# Patient Record
Sex: FEMALE | Race: OTHER | HISPANIC OR LATINO | Employment: UNEMPLOYED | URBAN - METROPOLITAN AREA
[De-identification: names, ages, dates, MRNs, and addresses within clinical notes are randomized per-mention and may not be internally consistent; named-entity substitution may affect disease eponyms.]

---

## 2019-11-08 ENCOUNTER — HOSPITAL ENCOUNTER (EMERGENCY)
Facility: HOSPITAL | Age: 44
Discharge: PSYCHIATRIC OR SUBSTANCE USE FACILITY | End: 2019-11-09
Attending: EMERGENCY MEDICINE
Payer: COMMERCIAL

## 2019-11-08 DIAGNOSIS — F30.10 MANIC BEHAVIOR: Primary | ICD-10-CM

## 2019-11-08 PROBLEM — S02.92XA FACIAL BONES, CLOSED FRACTURE: Status: ACTIVE | Noted: 2019-11-08

## 2019-11-08 PROBLEM — F31.9 BIPOLAR I DISORDER: Status: ACTIVE | Noted: 2019-10-22

## 2019-11-08 PROCEDURE — 80178 ASSAY OF LITHIUM: CPT

## 2019-11-08 PROCEDURE — 80329 ANALGESICS NON-OPIOID 1 OR 2: CPT

## 2019-11-08 PROCEDURE — 80307 DRUG TEST PRSMV CHEM ANLYZR: CPT

## 2019-11-08 PROCEDURE — 96360 HYDRATION IV INFUSION INIT: CPT

## 2019-11-08 PROCEDURE — 81001 URINALYSIS AUTO W/SCOPE: CPT

## 2019-11-08 PROCEDURE — 81025 URINE PREGNANCY TEST: CPT | Performed by: PHYSICIAN ASSISTANT

## 2019-11-08 PROCEDURE — 80320 DRUG SCREEN QUANTALCOHOLS: CPT

## 2019-11-08 PROCEDURE — 99284 PR EMERGENCY DEPT VISIT,LEVEL IV: ICD-10-PCS | Mod: ,,, | Performed by: EMERGENCY MEDICINE

## 2019-11-08 PROCEDURE — 99284 EMERGENCY DEPT VISIT MOD MDM: CPT | Mod: ,,, | Performed by: EMERGENCY MEDICINE

## 2019-11-08 PROCEDURE — 99285 EMERGENCY DEPT VISIT HI MDM: CPT | Mod: 25

## 2019-11-08 PROCEDURE — 82962 GLUCOSE BLOOD TEST: CPT

## 2019-11-08 PROCEDURE — 80053 COMPREHEN METABOLIC PANEL: CPT

## 2019-11-08 PROCEDURE — 84443 ASSAY THYROID STIM HORMONE: CPT

## 2019-11-08 PROCEDURE — 85025 COMPLETE CBC W/AUTO DIFF WBC: CPT

## 2019-11-08 RX ORDER — TRAZODONE HYDROCHLORIDE 50 MG/1
50 TABLET ORAL
Status: ON HOLD | COMMUNITY
End: 2019-11-14 | Stop reason: HOSPADM

## 2019-11-08 RX ORDER — LORATADINE 10 MG/1
10 TABLET ORAL
Status: ON HOLD | COMMUNITY
End: 2019-11-14 | Stop reason: HOSPADM

## 2019-11-08 RX ORDER — AMOXICILLIN 500 MG/1
500 TABLET, FILM COATED ORAL
Status: ON HOLD | COMMUNITY
End: 2019-11-14 | Stop reason: HOSPADM

## 2019-11-08 RX ORDER — LAMOTRIGINE 150 MG/1
150 TABLET ORAL
Status: ON HOLD | COMMUNITY
End: 2019-11-14 | Stop reason: HOSPADM

## 2019-11-09 VITALS
WEIGHT: 140 LBS | RESPIRATION RATE: 20 BRPM | BODY MASS INDEX: 23.9 KG/M2 | TEMPERATURE: 98 F | DIASTOLIC BLOOD PRESSURE: 51 MMHG | OXYGEN SATURATION: 99 % | SYSTOLIC BLOOD PRESSURE: 96 MMHG | HEART RATE: 84 BPM | HEIGHT: 64 IN

## 2019-11-09 PROBLEM — F31.9 BIPOLAR 1 DISORDER: Status: ACTIVE | Noted: 2019-11-09

## 2019-11-09 PROBLEM — E11.9 TYPE 2 DIABETES MELLITUS: Status: ACTIVE | Noted: 2019-11-09

## 2019-11-09 LAB
ALBUMIN SERPL BCP-MCNC: 3.8 G/DL (ref 3.5–5.2)
ALP SERPL-CCNC: 59 U/L (ref 55–135)
ALT SERPL W/O P-5'-P-CCNC: 17 U/L (ref 10–44)
AMPHET+METHAMPHET UR QL: NEGATIVE
ANION GAP SERPL CALC-SCNC: 13 MMOL/L (ref 8–16)
APAP SERPL-MCNC: <3 UG/ML (ref 10–20)
AST SERPL-CCNC: 18 U/L (ref 10–40)
B-HCG UR QL: NEGATIVE
BACTERIA #/AREA URNS AUTO: ABNORMAL /HPF
BARBITURATES UR QL SCN>200 NG/ML: NEGATIVE
BASOPHILS # BLD AUTO: 0.04 K/UL (ref 0–0.2)
BASOPHILS NFR BLD: 0.5 % (ref 0–1.9)
BENZODIAZ UR QL SCN>200 NG/ML: NEGATIVE
BILIRUB SERPL-MCNC: 0.2 MG/DL (ref 0.1–1)
BILIRUB UR QL STRIP: NEGATIVE
BUN SERPL-MCNC: 22 MG/DL (ref 6–20)
BZE UR QL SCN: NEGATIVE
CALCIUM SERPL-MCNC: 8.9 MG/DL (ref 8.7–10.5)
CANNABINOIDS UR QL SCN: NEGATIVE
CHLORIDE SERPL-SCNC: 105 MMOL/L (ref 95–110)
CLARITY UR REFRACT.AUTO: ABNORMAL
CO2 SERPL-SCNC: 23 MMOL/L (ref 23–29)
COLOR UR AUTO: YELLOW
CREAT SERPL-MCNC: 0.7 MG/DL (ref 0.5–1.4)
CREAT UR-MCNC: 133 MG/DL (ref 15–325)
CTP QC/QA: YES
DIFFERENTIAL METHOD: ABNORMAL
EOSINOPHIL # BLD AUTO: 0.3 K/UL (ref 0–0.5)
EOSINOPHIL NFR BLD: 3.1 % (ref 0–8)
ERYTHROCYTE [DISTWIDTH] IN BLOOD BY AUTOMATED COUNT: 12.2 % (ref 11.5–14.5)
EST. GFR  (AFRICAN AMERICAN): >60 ML/MIN/1.73 M^2
EST. GFR  (NON AFRICAN AMERICAN): >60 ML/MIN/1.73 M^2
ETHANOL SERPL-MCNC: <10 MG/DL
GLUCOSE SERPL-MCNC: 224 MG/DL (ref 70–110)
GLUCOSE UR QL STRIP: ABNORMAL
HCT VFR BLD AUTO: 38.5 % (ref 37–48.5)
HGB BLD-MCNC: 12.8 G/DL (ref 12–16)
HGB UR QL STRIP: NEGATIVE
IMM GRANULOCYTES # BLD AUTO: 0.04 K/UL (ref 0–0.04)
IMM GRANULOCYTES NFR BLD AUTO: 0.5 % (ref 0–0.5)
KETONES UR QL STRIP: ABNORMAL
LEUKOCYTE ESTERASE UR QL STRIP: NEGATIVE
LITHIUM SERPL-SCNC: <0.1 MMOL/L (ref 0.6–1.2)
LYMPHOCYTES # BLD AUTO: 3.1 K/UL (ref 1–4.8)
LYMPHOCYTES NFR BLD: 36.3 % (ref 18–48)
MCH RBC QN AUTO: 33.2 PG (ref 27–31)
MCHC RBC AUTO-ENTMCNC: 33.2 G/DL (ref 32–36)
MCV RBC AUTO: 100 FL (ref 82–98)
METHADONE UR QL SCN>300 NG/ML: NEGATIVE
MICROSCOPIC COMMENT: ABNORMAL
MONOCYTES # BLD AUTO: 0.6 K/UL (ref 0.3–1)
MONOCYTES NFR BLD: 7.4 % (ref 4–15)
NEUTROPHILS # BLD AUTO: 4.5 K/UL (ref 1.8–7.7)
NEUTROPHILS NFR BLD: 52.2 % (ref 38–73)
NITRITE UR QL STRIP: NEGATIVE
NRBC BLD-RTO: 0 /100 WBC
OPIATES UR QL SCN: NEGATIVE
PCP UR QL SCN>25 NG/ML: NEGATIVE
PH UR STRIP: 5 [PH] (ref 5–8)
PLATELET # BLD AUTO: 220 K/UL (ref 150–350)
PMV BLD AUTO: 9.4 FL (ref 9.2–12.9)
POCT GLUCOSE: 198 MG/DL (ref 70–110)
POTASSIUM SERPL-SCNC: 3.7 MMOL/L (ref 3.5–5.1)
PROT SERPL-MCNC: 6.5 G/DL (ref 6–8.4)
PROT UR QL STRIP: NEGATIVE
RBC # BLD AUTO: 3.85 M/UL (ref 4–5.4)
RBC #/AREA URNS AUTO: 9 /HPF (ref 0–4)
SODIUM SERPL-SCNC: 141 MMOL/L (ref 136–145)
SP GR UR STRIP: 1.02 (ref 1–1.03)
SQUAMOUS #/AREA URNS AUTO: 3 /HPF
TOXICOLOGY INFORMATION: NORMAL
TSH SERPL DL<=0.005 MIU/L-ACNC: 0.99 UIU/ML (ref 0.4–4)
URN SPEC COLLECT METH UR: ABNORMAL
WBC # BLD AUTO: 8.66 K/UL (ref 3.9–12.7)
WBC #/AREA URNS AUTO: 0 /HPF (ref 0–5)
YEAST UR QL AUTO: ABNORMAL

## 2019-11-09 PROCEDURE — 63600175 PHARM REV CODE 636 W HCPCS: Performed by: PHYSICIAN ASSISTANT

## 2019-11-09 RX ADMIN — SODIUM CHLORIDE 500 ML: 0.9 INJECTION, SOLUTION INTRAVENOUS at 01:11

## 2019-11-09 NOTE — ED NOTES
"Pt belongings placed in belongings bag as follows    1 pair red boots  1 pair jeans  1 ana luisa dye belt  1 pair earrings  1 black shirt  1 white bra  1 [employer] watch  1 rubber bracelet  1 jacket  1 red necklace  1 feather ring, 1 blue/white ring, 1 thumb ring saying "Lord", 1 [employer] ring  1 cellphone  "

## 2019-11-09 NOTE — ED NOTES
Pt calm and cooperative at present.  Sitter at bedside documenting Q15 min checks with pt in direct view.  Pt in NAD breathing even and unlabored.  Pt denies discomfort at present.  Will continue to monitor.   No

## 2019-11-09 NOTE — ED NOTES
LOC: Pt is awake, alert, oriented x4. Affect is hyperalert. Speech clear and appropriate.      Appearance: Pt resting comfortably in no acute distress. Pt clean and well groomed.     Skin: Skin warm and dry. Color consistent with ethnicity. Skin turgor normal. Mucus membranes moist. Skin intact. No breakdown or bruising noted.     Musculoskeletal: Pt moving upper and lower extremities without difficulty. Denies weakness. Has brace to right wrist for previous injury.     Respiratory: Airway open and patent. Respirations spontaneous, even, easy, and unlabored. Pt has normal effort and rate. No accessory muscle use noted. Denies cough. Denies shortness of breath.     Cardiovascular: No peripheral edema noted. No complaints of chest pain. S1S2 present. Rate regular. Radial pulses 2+.     Abdominal: Abdomen soft and nontender. No distention noted. Denies n/v. Bowels sounds active x 4 quadrants.     Neurological: Eyes open spontaneously. Behavior appropriate to situation. Follows commands appropriately. Facial expression symmetrical. Purposeful motor response. Sensation intact.

## 2019-11-09 NOTE — ED NOTES
Pt calm and cooperative at present.  Sitter at bedside documenting Q15 min checks with pt in direct view.  Pt in NAD breathing even and unlabored.  Pt denies discomfort at present.  Will continue to monitor.

## 2019-11-09 NOTE — ED TRIAGE NOTES
Brought in per  office under OPC. OPC placed per sister. Pt states her sister and her were arguing about selling her late mothers house so sister had OPC placed on her. Pt denies threatening to hurt anybody or herself. Pt has hx of suicidal ideations with one attempt approx 2 years ago. Pt took handful of metformin on suicide attempt. Pt seeing outside physcologist. Has received in patient treatment at [facility] before. Pt cooperative at this time.
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
## 2021-03-15 ENCOUNTER — PATIENT MESSAGE (OUTPATIENT)
Dept: ADMINISTRATIVE | Facility: CLINIC | Age: 46
End: 2021-03-15

## 2021-03-20 ENCOUNTER — IMMUNIZATION (OUTPATIENT)
Dept: PRIMARY CARE CLINIC | Facility: CLINIC | Age: 46
End: 2021-03-20
Payer: COMMERCIAL

## 2021-03-20 DIAGNOSIS — Z23 NEED FOR VACCINATION: Primary | ICD-10-CM

## 2021-03-20 PROCEDURE — 0001A PR IMMUNIZ ADMIN, SARS-COV-2 COVID-19 VACC, 30MCG/0.3ML, 1ST DOSE: ICD-10-PCS | Mod: CV19,S$GLB,, | Performed by: INTERNAL MEDICINE

## 2021-03-20 PROCEDURE — 0001A PR IMMUNIZ ADMIN, SARS-COV-2 COVID-19 VACC, 30MCG/0.3ML, 1ST DOSE: CPT | Mod: CV19,S$GLB,, | Performed by: INTERNAL MEDICINE

## 2021-03-20 PROCEDURE — 91300 PR SARS-COV- 2 COVID-19 VACCINE, NO PRSV, 30MCG/0.3ML, IM: CPT | Mod: S$GLB,,, | Performed by: INTERNAL MEDICINE

## 2021-03-20 PROCEDURE — 91300 PR SARS-COV- 2 COVID-19 VACCINE, NO PRSV, 30MCG/0.3ML, IM: ICD-10-PCS | Mod: S$GLB,,, | Performed by: INTERNAL MEDICINE

## 2021-03-20 RX ADMIN — Medication 0.3 ML: at 10:03

## 2021-04-18 ENCOUNTER — IMMUNIZATION (OUTPATIENT)
Dept: PRIMARY CARE CLINIC | Facility: CLINIC | Age: 46
End: 2021-04-18

## 2021-04-18 DIAGNOSIS — Z23 NEED FOR VACCINATION: Primary | ICD-10-CM

## 2021-04-18 PROCEDURE — 0002A PR IMMUNIZ ADMIN, SARS-COV-2 COVID-19 VACC, 30MCG/0.3ML, 2ND DOSE: ICD-10-PCS | Mod: CV19,S$GLB,, | Performed by: INTERNAL MEDICINE

## 2021-04-18 PROCEDURE — 91300 PR SARS-COV- 2 COVID-19 VACCINE, NO PRSV, 30MCG/0.3ML, IM: CPT | Mod: S$GLB,,, | Performed by: INTERNAL MEDICINE

## 2021-04-18 PROCEDURE — 0002A PR IMMUNIZ ADMIN, SARS-COV-2 COVID-19 VACC, 30MCG/0.3ML, 2ND DOSE: CPT | Mod: CV19,S$GLB,, | Performed by: INTERNAL MEDICINE

## 2021-04-18 PROCEDURE — 91300 PR SARS-COV- 2 COVID-19 VACCINE, NO PRSV, 30MCG/0.3ML, IM: ICD-10-PCS | Mod: S$GLB,,, | Performed by: INTERNAL MEDICINE

## 2021-04-18 RX ADMIN — Medication 0.3 ML: at 01:04

## 2024-07-08 ENCOUNTER — HOSPITAL ENCOUNTER (EMERGENCY)
Facility: HOSPITAL | Age: 49
Discharge: PSYCHIATRIC OR SUBSTANCE USE FACILITY | End: 2024-07-09
Attending: EMERGENCY MEDICINE
Payer: MEDICAID

## 2024-07-08 DIAGNOSIS — F30.10 MANIC BEHAVIOR: Primary | ICD-10-CM

## 2024-07-08 LAB
ALBUMIN SERPL BCP-MCNC: 4 G/DL (ref 3.5–5.2)
ALP SERPL-CCNC: 84 U/L (ref 55–135)
ALT SERPL W/O P-5'-P-CCNC: 25 U/L (ref 10–44)
AMPHET+METHAMPHET UR QL: NEGATIVE
ANION GAP SERPL CALC-SCNC: 9 MMOL/L (ref 8–16)
APAP SERPL-MCNC: <3 UG/ML (ref 10–20)
AST SERPL-CCNC: 17 U/L (ref 10–40)
BACTERIA #/AREA URNS AUTO: ABNORMAL /HPF
BARBITURATES UR QL SCN>200 NG/ML: NEGATIVE
BASOPHILS # BLD AUTO: 0.03 K/UL (ref 0–0.2)
BASOPHILS NFR BLD: 0.4 % (ref 0–1.9)
BENZODIAZ UR QL SCN>200 NG/ML: NEGATIVE
BILIRUB SERPL-MCNC: 0.4 MG/DL (ref 0.1–1)
BILIRUB UR QL STRIP: NEGATIVE
BUN SERPL-MCNC: 21 MG/DL (ref 6–20)
BZE UR QL SCN: NEGATIVE
CALCIUM SERPL-MCNC: 9.6 MG/DL (ref 8.7–10.5)
CANNABINOIDS UR QL SCN: NEGATIVE
CHLORIDE SERPL-SCNC: 103 MMOL/L (ref 95–110)
CLARITY UR REFRACT.AUTO: CLEAR
CO2 SERPL-SCNC: 23 MMOL/L (ref 23–29)
COLOR UR AUTO: COLORLESS
CREAT SERPL-MCNC: 0.7 MG/DL (ref 0.5–1.4)
CREAT UR-MCNC: 32 MG/DL (ref 15–325)
DIFFERENTIAL METHOD BLD: ABNORMAL
EOSINOPHIL # BLD AUTO: 0.1 K/UL (ref 0–0.5)
EOSINOPHIL NFR BLD: 1.7 % (ref 0–8)
ERYTHROCYTE [DISTWIDTH] IN BLOOD BY AUTOMATED COUNT: 12.3 % (ref 11.5–14.5)
EST. GFR  (NO RACE VARIABLE): >60 ML/MIN/1.73 M^2
ETHANOL SERPL-MCNC: <10 MG/DL
GLUCOSE SERPL-MCNC: 281 MG/DL (ref 70–110)
GLUCOSE UR QL STRIP: ABNORMAL
HCT VFR BLD AUTO: 36 % (ref 37–48.5)
HCV AB SERPL QL IA: NORMAL
HGB BLD-MCNC: 12.6 G/DL (ref 12–16)
HGB UR QL STRIP: ABNORMAL
HIV 1+2 AB+HIV1 P24 AG SERPL QL IA: NORMAL
IMM GRANULOCYTES # BLD AUTO: 0.03 K/UL (ref 0–0.04)
IMM GRANULOCYTES NFR BLD AUTO: 0.4 % (ref 0–0.5)
KETONES UR QL STRIP: NEGATIVE
LEUKOCYTE ESTERASE UR QL STRIP: NEGATIVE
LYMPHOCYTES # BLD AUTO: 2.3 K/UL (ref 1–4.8)
LYMPHOCYTES NFR BLD: 29.9 % (ref 18–48)
MCH RBC QN AUTO: 33.9 PG (ref 27–31)
MCHC RBC AUTO-ENTMCNC: 35 G/DL (ref 32–36)
MCV RBC AUTO: 97 FL (ref 82–98)
METHADONE UR QL SCN>300 NG/ML: NEGATIVE
MICROSCOPIC COMMENT: ABNORMAL
MONOCYTES # BLD AUTO: 0.5 K/UL (ref 0.3–1)
MONOCYTES NFR BLD: 6.9 % (ref 4–15)
NEUTROPHILS # BLD AUTO: 4.8 K/UL (ref 1.8–7.7)
NEUTROPHILS NFR BLD: 60.7 % (ref 38–73)
NITRITE UR QL STRIP: NEGATIVE
NRBC BLD-RTO: 0 /100 WBC
OPIATES UR QL SCN: NEGATIVE
PCP UR QL SCN>25 NG/ML: NEGATIVE
PH UR STRIP: 6 [PH] (ref 5–8)
PLATELET # BLD AUTO: 213 K/UL (ref 150–450)
PMV BLD AUTO: 9.8 FL (ref 9.2–12.9)
POTASSIUM SERPL-SCNC: 3.7 MMOL/L (ref 3.5–5.1)
PROT SERPL-MCNC: 7.2 G/DL (ref 6–8.4)
PROT UR QL STRIP: NEGATIVE
RBC # BLD AUTO: 3.72 M/UL (ref 4–5.4)
RBC #/AREA URNS AUTO: 4 /HPF (ref 0–4)
SODIUM SERPL-SCNC: 135 MMOL/L (ref 136–145)
SP GR UR STRIP: 1.02 (ref 1–1.03)
SQUAMOUS #/AREA URNS AUTO: 0 /HPF
TOXICOLOGY INFORMATION: NORMAL
TSH SERPL DL<=0.005 MIU/L-ACNC: 1.32 UIU/ML (ref 0.4–4)
URN SPEC COLLECT METH UR: ABNORMAL
WBC # BLD AUTO: 7.83 K/UL (ref 3.9–12.7)
WBC #/AREA URNS AUTO: 1 /HPF (ref 0–5)
YEAST UR QL AUTO: ABNORMAL

## 2024-07-08 PROCEDURE — 80053 COMPREHEN METABOLIC PANEL: CPT | Performed by: EMERGENCY MEDICINE

## 2024-07-08 PROCEDURE — 85025 COMPLETE CBC W/AUTO DIFF WBC: CPT | Performed by: EMERGENCY MEDICINE

## 2024-07-08 PROCEDURE — 81001 URINALYSIS AUTO W/SCOPE: CPT | Mod: XB | Performed by: EMERGENCY MEDICINE

## 2024-07-08 PROCEDURE — 84443 ASSAY THYROID STIM HORMONE: CPT | Performed by: EMERGENCY MEDICINE

## 2024-07-08 PROCEDURE — 86803 HEPATITIS C AB TEST: CPT | Performed by: PHYSICIAN ASSISTANT

## 2024-07-08 PROCEDURE — 80143 DRUG ASSAY ACETAMINOPHEN: CPT | Performed by: EMERGENCY MEDICINE

## 2024-07-08 PROCEDURE — 82077 ASSAY SPEC XCP UR&BREATH IA: CPT | Performed by: EMERGENCY MEDICINE

## 2024-07-08 PROCEDURE — 99285 EMERGENCY DEPT VISIT HI MDM: CPT

## 2024-07-08 PROCEDURE — 87389 HIV-1 AG W/HIV-1&-2 AB AG IA: CPT | Performed by: PHYSICIAN ASSISTANT

## 2024-07-08 PROCEDURE — 80307 DRUG TEST PRSMV CHEM ANLYZR: CPT | Performed by: EMERGENCY MEDICINE

## 2024-07-08 RX ORDER — ACETAMINOPHEN 500 MG
1000 TABLET ORAL
Status: COMPLETED | OUTPATIENT
Start: 2024-07-08 | End: 2024-07-09
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
## 2024-07-08 NOTE — ED NOTES
Valuables bag #[id number] with phone, wallet, vape, necklaces, rings, hair clips    Belongings bag in closet with clothes, shoes, calendar

## 2024-07-09 VITALS
DIASTOLIC BLOOD PRESSURE: 66 MMHG | OXYGEN SATURATION: 98 % | WEIGHT: 121 LBS | SYSTOLIC BLOOD PRESSURE: 126 MMHG | RESPIRATION RATE: 18 BRPM | HEIGHT: 64 IN | BODY MASS INDEX: 20.66 KG/M2 | TEMPERATURE: 98 F | HEART RATE: 69 BPM

## 2024-07-09 PROCEDURE — 25000003 PHARM REV CODE 250: Performed by: EMERGENCY MEDICINE

## 2024-07-09 RX ADMIN — ACETAMINOPHEN 1000 MG: 500 TABLET ORAL at 12:07

## 2024-07-09 NOTE — ED NOTES
Patient identifiers for Sandrine Roland checked and correct.    LOC: The patient is awake, alert and aware of environment with an appropriate affect, the patient is oriented x 4 and speaking appropriately.  APPEARANCE: Patient resting comfortably and in no acute distress, patient is clean and well groomed, patient's clothing is properly fastened.  SKIN: The skin is warm and dry, color consistent with ethnicity, patient has normal skin turgor and moist mucus membranes, skin intact, no breakdown or bruising noted.  MUSCULOSKELETAL: Patient moving all extremities well, no obvious swelling or deformities noted.  RESPIRATORY: Airway is open and patent, respirations are spontaneous and even, patient has a normal effort and rate.  CARDIAC: Patient has a normal rate and rhythm, no periphreal edema noted, capillary refill < 3 seconds.  ABDOMEN: Soft and non tender to palpation, no distention noted. Patient denies any nausea, vomiting, diarrhea, or constipation.   NEUROLOGIC: Eyes open spontaneously, PERRL, behavior appropriate to situation, follows commands, facial expression symmetrical, bilateral hand grasp equal and even, purposeful motor response noted, normal sensation in all extremities.   HEENT: No abnormalities noted. White sclera and pupils equal round and reactive to light. Denies headache, dizziness.   : Pt voids independently, denies dysuria, hematuria, frequency.

## 2024-07-09 NOTE — ED NOTES
"The patient is recv'd lying in bed awake. She is attired in [facility] scrubs w/ fair grooming & hygiene. Her affect is animated, mood is jovial & engaging. Thought process is tangential. She states the reason for her ED presentation is " my sister, the ring leader got a 's on me & lied & said I wasn't taking my medicine & I was." She denies S/HI, A/VH. She is maintained on DVC for safety, sitter present.  "

## 2024-07-09 NOTE — ED NOTES
The patient is escorted via w/c per EDT & hospital security to [facility] vehicle. All belongings given to [facility] . The patient did not state that anyone should be notified of her transfer.

## 2024-07-09 NOTE — ED NOTES
Patient has resumed a resting position in bed, pillow provided for comfort. DVC maintained for safety, sitter present. Awaiting transfer.

## 2024-07-09 NOTE — ED NOTES
Pt remains in paper scrubs, resting in stretcher comfortably - with side rails up, locked, and in lowest position. Chest rise and fall noted; breathing equal, even, and unlabored. Sitter remains at bedside in direct visual contact, charting per protocol every 15 minutes. No equipment or belongings are in the patients room to prevent self harm or injury. Pt aware of plan of care. No acute distress noted and no needs expressed at this time. Pt provided with sandwich and juice per request. Pt sitting up eating at this time.

## 2024-07-16 PROBLEM — F31.13 BIPOLAR DISORDER, CURRENT EPISODE MANIC, SEVERE: Status: ACTIVE | Noted: 2024-07-16

## 2024-09-04 DIAGNOSIS — S50.01XA CONTUSION OF RIGHT ELBOW: Primary | ICD-10-CM

## 2024-10-17 ENCOUNTER — PATIENT MESSAGE (OUTPATIENT)
Dept: RESEARCH | Facility: HOSPITAL | Age: 49
End: 2024-10-17
Payer: MEDICAID

## 2025-06-09 ENCOUNTER — HOSPITAL ENCOUNTER (EMERGENCY)
Facility: HOSPITAL | Age: 50
Discharge: PSYCHIATRIC OR SUBSTANCE USE FACILITY | End: 2025-06-10
Attending: EMERGENCY MEDICINE
Payer: MEDICAID

## 2025-06-09 VITALS
DIASTOLIC BLOOD PRESSURE: 84 MMHG | RESPIRATION RATE: 18 BRPM | BODY MASS INDEX: 23.68 KG/M2 | HEIGHT: 63 IN | TEMPERATURE: 99 F | SYSTOLIC BLOOD PRESSURE: 149 MMHG | HEART RATE: 93 BPM | WEIGHT: 133.63 LBS | OXYGEN SATURATION: 98 %

## 2025-06-09 DIAGNOSIS — E87.6 HYPOKALEMIA: ICD-10-CM

## 2025-06-09 DIAGNOSIS — F41.9 ANXIETY: ICD-10-CM

## 2025-06-09 DIAGNOSIS — Z00.8 MEDICAL CLEARANCE FOR PSYCHIATRIC ADMISSION: Primary | ICD-10-CM

## 2025-06-09 LAB
ABSOLUTE EOSINOPHIL (OHS): 0.25 K/UL
ABSOLUTE MONOCYTE (OHS): 0.9 K/UL (ref 0.3–1)
ABSOLUTE NEUTROPHIL COUNT (OHS): 10.25 K/UL (ref 1.8–7.7)
ALBUMIN SERPL BCP-MCNC: 5 G/DL (ref 3.5–5.2)
ALP SERPL-CCNC: 80 UNIT/L (ref 38–126)
ALT SERPL W/O P-5'-P-CCNC: 23 UNIT/L (ref 10–44)
AMPHET UR QL SCN: NEGATIVE
ANION GAP (OHS): 17 MMOL/L (ref 8–16)
APAP SERPL-MCNC: <10 UG/ML (ref 10–20)
AST SERPL-CCNC: 20 UNIT/L (ref 15–46)
BACTERIA #/AREA URNS HPF: ABNORMAL /HPF
BARBITURATE SCN PRESENT UR: NEGATIVE
BASOPHILS # BLD AUTO: 0.06 K/UL
BASOPHILS NFR BLD AUTO: 0.4 %
BENZODIAZ UR QL SCN: NEGATIVE
BILIRUB SERPL-MCNC: 0.9 MG/DL (ref 0.1–1)
BILIRUB UR QL STRIP.AUTO: NEGATIVE
BUN SERPL-MCNC: 4 MG/DL (ref 7–17)
CALCIUM SERPL-MCNC: 9.5 MG/DL (ref 8.7–10.5)
CANNABINOIDS UR QL SCN: NEGATIVE
CHLORIDE SERPL-SCNC: 98 MMOL/L (ref 95–110)
CLARITY UR: CLEAR
CO2 SERPL-SCNC: 19 MMOL/L (ref 23–29)
COCAINE UR QL SCN: NEGATIVE
COLOR UR AUTO: YELLOW
CREAT SERPL-MCNC: 0.4 MG/DL (ref 0.5–1.4)
CREAT UR-MCNC: 32.1 MG/DL (ref 15–325)
ERYTHROCYTE [DISTWIDTH] IN BLOOD BY AUTOMATED COUNT: 11.8 % (ref 11.5–14.5)
ETHANOL SERPL-MCNC: <10 MG/DL
GFR SERPLBLD CREATININE-BSD FMLA CKD-EPI: >60 ML/MIN/1.73/M2
GLUCOSE SERPL-MCNC: 274 MG/DL (ref 70–110)
GLUCOSE UR QL STRIP: ABNORMAL
HCT VFR BLD AUTO: 40.5 % (ref 37–48.5)
HGB BLD-MCNC: 14.8 GM/DL (ref 12–16)
HGB UR QL STRIP: ABNORMAL
HOLD SPECIMEN: NORMAL
IMM GRANULOCYTES # BLD AUTO: 0.05 K/UL (ref 0–0.04)
IMM GRANULOCYTES NFR BLD AUTO: 0.3 % (ref 0–0.5)
KETONES UR QL STRIP: ABNORMAL
LEUKOCYTE ESTERASE UR QL STRIP: NEGATIVE
LYMPHOCYTES # BLD AUTO: 4.53 K/UL (ref 1–4.8)
MCH RBC QN AUTO: 33.4 PG (ref 27–31)
MCHC RBC AUTO-ENTMCNC: 36.5 G/DL (ref 32–36)
MCV RBC AUTO: 91 FL (ref 82–98)
METHADONE UR QL SCN: NEGATIVE
MICROSCOPIC COMMENT: ABNORMAL
NITRITE UR QL STRIP: NEGATIVE
NUCLEATED RBC (/100WBC) (OHS): 0 /100 WBC
OPIATES UR QL SCN: NEGATIVE
PCP UR QL: NEGATIVE
PH UR STRIP: 6 [PH]
PLATELET # BLD AUTO: 230 K/UL (ref 150–450)
PMV BLD AUTO: 9.7 FL (ref 9.2–12.9)
POTASSIUM SERPL-SCNC: 3.3 MMOL/L (ref 3.5–5.1)
PROT SERPL-MCNC: 8.2 GM/DL (ref 6–8.4)
PROT UR QL STRIP: NEGATIVE
RBC # BLD AUTO: 4.43 M/UL (ref 4–5.4)
RBC #/AREA URNS HPF: 0 /HPF (ref 0–4)
RELATIVE EOSINOPHIL (OHS): 1.6 %
RELATIVE LYMPHOCYTE (OHS): 28.2 % (ref 18–48)
RELATIVE MONOCYTE (OHS): 5.6 % (ref 4–15)
RELATIVE NEUTROPHIL (OHS): 63.9 % (ref 38–73)
SODIUM SERPL-SCNC: 134 MMOL/L (ref 136–145)
SP GR UR STRIP: <=1.005
UROBILINOGEN UR STRIP-ACNC: NEGATIVE EU/DL
WBC # BLD AUTO: 16.04 K/UL (ref 3.9–12.7)
WBC #/AREA URNS HPF: 0 /HPF (ref 0–5)
YEAST URNS QL MICRO: ABNORMAL /HPF

## 2025-06-09 PROCEDURE — 82040 ASSAY OF SERUM ALBUMIN: CPT | Mod: ER | Performed by: EMERGENCY MEDICINE

## 2025-06-09 PROCEDURE — 85025 COMPLETE CBC W/AUTO DIFF WBC: CPT | Mod: ER | Performed by: EMERGENCY MEDICINE

## 2025-06-09 PROCEDURE — 99285 EMERGENCY DEPT VISIT HI MDM: CPT | Mod: ER

## 2025-06-09 PROCEDURE — 80307 DRUG TEST PRSMV CHEM ANLYZR: CPT | Mod: ER | Performed by: EMERGENCY MEDICINE

## 2025-06-09 PROCEDURE — 80143 DRUG ASSAY ACETAMINOPHEN: CPT | Mod: ER | Performed by: EMERGENCY MEDICINE

## 2025-06-09 PROCEDURE — S4991 NICOTINE PATCH NONLEGEND: HCPCS | Mod: ER | Performed by: EMERGENCY MEDICINE

## 2025-06-09 PROCEDURE — 25000003 PHARM REV CODE 250: Mod: ER | Performed by: EMERGENCY MEDICINE

## 2025-06-09 PROCEDURE — 81001 URINALYSIS AUTO W/SCOPE: CPT | Mod: ER | Performed by: EMERGENCY MEDICINE

## 2025-06-09 PROCEDURE — 82077 ASSAY SPEC XCP UR&BREATH IA: CPT | Mod: ER | Performed by: EMERGENCY MEDICINE

## 2025-06-09 RX ORDER — POTASSIUM CHLORIDE 20 MEQ/1
40 TABLET, EXTENDED RELEASE ORAL
Status: COMPLETED | OUTPATIENT
Start: 2025-06-09 | End: 2025-06-09

## 2025-06-09 RX ORDER — MELOXICAM 15 MG/1
15 TABLET ORAL DAILY
Status: ON HOLD | COMMUNITY
End: 2025-06-18 | Stop reason: HOSPADM

## 2025-06-09 RX ORDER — METFORMIN HYDROCHLORIDE 500 MG/1
1000 TABLET ORAL
Status: COMPLETED | OUTPATIENT
Start: 2025-06-09 | End: 2025-06-09

## 2025-06-09 RX ORDER — QUETIAPINE FUMARATE 100 MG/1
TABLET, FILM COATED ORAL
Status: ON HOLD | COMMUNITY
End: 2025-06-18 | Stop reason: HOSPADM

## 2025-06-09 RX ORDER — NITROFURANTOIN 25; 75 MG/1; MG/1
100 CAPSULE ORAL
Status: COMPLETED | OUTPATIENT
Start: 2025-06-09 | End: 2025-06-09

## 2025-06-09 RX ORDER — FLUOXETINE 10 MG/1
10 CAPSULE ORAL DAILY
Status: ON HOLD | COMMUNITY
End: 2025-06-18 | Stop reason: HOSPADM

## 2025-06-09 RX ORDER — IBUPROFEN 200 MG
1 TABLET ORAL
Status: DISCONTINUED | OUTPATIENT
Start: 2025-06-09 | End: 2025-06-10 | Stop reason: HOSPADM

## 2025-06-09 RX ADMIN — METFORMIN HYDROCHLORIDE 1000 MG: 500 TABLET ORAL at 11:06

## 2025-06-09 RX ADMIN — POTASSIUM CHLORIDE 40 MEQ: 1500 TABLET, EXTENDED RELEASE ORAL at 10:06

## 2025-06-09 RX ADMIN — NITROFURANTOIN MONOHYDRATE/MACROCRYSTALS 100 MG: 25; 75 CAPSULE ORAL at 10:06

## 2025-06-09 RX ADMIN — NICOTINE 1 PATCH: 21 PATCH, EXTENDED RELEASE TRANSDERMAL at 09:06

## 2025-06-10 ENCOUNTER — HOSPITAL ENCOUNTER (INPATIENT)
Facility: HOSPITAL | Age: 50
LOS: 8 days | Discharge: HOME | DRG: 885 | End: 2025-06-18
Attending: PSYCHIATRY & NEUROLOGY | Admitting: PSYCHIATRY & NEUROLOGY
Payer: MEDICAID

## 2025-06-10 DIAGNOSIS — R09.81 NOSE CONGESTION: ICD-10-CM

## 2025-06-10 DIAGNOSIS — F41.9 ANXIETY: ICD-10-CM

## 2025-06-10 DIAGNOSIS — F31.13 BIPOLAR DISORDER, CURRENT EPISODE MANIC WITHOUT PSYCHOTIC FEATURES, SEVERE: Primary | ICD-10-CM

## 2025-06-10 PROBLEM — E87.6 HYPOKALEMIA: Status: ACTIVE | Noted: 2025-06-10

## 2025-06-10 PROBLEM — N39.0 UTI (URINARY TRACT INFECTION): Status: ACTIVE | Noted: 2025-06-10

## 2025-06-10 LAB
POCT GLUCOSE: 267 MG/DL (ref 70–110)
POCT GLUCOSE: 296 MG/DL (ref 70–110)
POCT GLUCOSE: 326 MG/DL (ref 70–110)

## 2025-06-10 PROCEDURE — 11400000 HC PSYCH PRIVATE ROOM

## 2025-06-10 PROCEDURE — 99223 1ST HOSP IP/OBS HIGH 75: CPT | Mod: AF,HB,, | Performed by: PSYCHIATRY & NEUROLOGY

## 2025-06-10 PROCEDURE — 90833 PSYTX W PT W E/M 30 MIN: CPT | Mod: AF,HB,, | Performed by: PSYCHIATRY & NEUROLOGY

## 2025-06-10 PROCEDURE — 25000003 PHARM REV CODE 250: Performed by: NURSE PRACTITIONER

## 2025-06-10 PROCEDURE — 63600175 PHARM REV CODE 636 W HCPCS: Performed by: NURSE PRACTITIONER

## 2025-06-10 PROCEDURE — S4991 NICOTINE PATCH NONLEGEND: HCPCS | Performed by: PSYCHIATRY & NEUROLOGY

## 2025-06-10 PROCEDURE — 25000003 PHARM REV CODE 250: Performed by: PSYCHIATRY & NEUROLOGY

## 2025-06-10 RX ORDER — IBUPROFEN 400 MG/1
400 TABLET, FILM COATED ORAL EVERY 6 HOURS PRN
Status: DISCONTINUED | OUTPATIENT
Start: 2025-06-10 | End: 2025-06-10

## 2025-06-10 RX ORDER — LOPERAMIDE HYDROCHLORIDE 2 MG/1
4 CAPSULE ORAL 4 TIMES DAILY PRN
Status: DISCONTINUED | OUTPATIENT
Start: 2025-06-10 | End: 2025-06-10

## 2025-06-10 RX ORDER — ALUMINUM HYDROXIDE, MAGNESIUM HYDROXIDE, AND SIMETHICONE 1200; 120; 1200 MG/30ML; MG/30ML; MG/30ML
30 SUSPENSION ORAL EVERY 6 HOURS PRN
Status: DISCONTINUED | OUTPATIENT
Start: 2025-06-10 | End: 2025-06-18 | Stop reason: HOSPADM

## 2025-06-10 RX ORDER — LURASIDONE HYDROCHLORIDE 20 MG/1
20 TABLET, FILM COATED ORAL
Status: DISCONTINUED | OUTPATIENT
Start: 2025-06-10 | End: 2025-06-11

## 2025-06-10 RX ORDER — BENZTROPINE MESYLATE 1 MG/ML
2 INJECTION, SOLUTION INTRAMUSCULAR; INTRAVENOUS EVERY 8 HOURS PRN
Status: DISCONTINUED | OUTPATIENT
Start: 2025-06-10 | End: 2025-06-18 | Stop reason: HOSPADM

## 2025-06-10 RX ORDER — DOCUSATE SODIUM 100 MG/1
100 CAPSULE, LIQUID FILLED ORAL DAILY PRN
Status: DISCONTINUED | OUTPATIENT
Start: 2025-06-10 | End: 2025-06-10 | Stop reason: SDUPTHER

## 2025-06-10 RX ORDER — BENZONATATE 100 MG/1
100 CAPSULE ORAL 3 TIMES DAILY PRN
Status: DISCONTINUED | OUTPATIENT
Start: 2025-06-10 | End: 2025-06-18 | Stop reason: HOSPADM

## 2025-06-10 RX ORDER — HYDROXYZINE PAMOATE 50 MG/1
50 CAPSULE ORAL EVERY 6 HOURS PRN
Status: DISCONTINUED | OUTPATIENT
Start: 2025-06-10 | End: 2025-06-16

## 2025-06-10 RX ORDER — IBUPROFEN 200 MG
16 TABLET ORAL
Status: DISCONTINUED | OUTPATIENT
Start: 2025-06-10 | End: 2025-06-18 | Stop reason: HOSPADM

## 2025-06-10 RX ORDER — PROPRANOLOL HYDROCHLORIDE 10 MG/1
10 TABLET ORAL 2 TIMES DAILY
Status: DISCONTINUED | OUTPATIENT
Start: 2025-06-10 | End: 2025-06-14

## 2025-06-10 RX ORDER — HYDROXYZINE PAMOATE 50 MG/1
50 CAPSULE ORAL EVERY 6 HOURS PRN
Status: DISCONTINUED | OUTPATIENT
Start: 2025-06-10 | End: 2025-06-10 | Stop reason: SDUPTHER

## 2025-06-10 RX ORDER — OLANZAPINE 10 MG/1
10 TABLET, FILM COATED ORAL EVERY 8 HOURS PRN
Status: DISCONTINUED | OUTPATIENT
Start: 2025-06-10 | End: 2025-06-18 | Stop reason: HOSPADM

## 2025-06-10 RX ORDER — INSULIN ASPART 100 [IU]/ML
0-5 INJECTION, SOLUTION INTRAVENOUS; SUBCUTANEOUS
Status: DISCONTINUED | OUTPATIENT
Start: 2025-06-10 | End: 2025-06-18 | Stop reason: HOSPADM

## 2025-06-10 RX ORDER — LOPERAMIDE HYDROCHLORIDE 2 MG/1
2 CAPSULE ORAL
Status: DISCONTINUED | OUTPATIENT
Start: 2025-06-10 | End: 2025-06-18 | Stop reason: HOSPADM

## 2025-06-10 RX ORDER — OLANZAPINE 10 MG/2ML
10 INJECTION, POWDER, FOR SOLUTION INTRAMUSCULAR EVERY 8 HOURS PRN
Status: DISCONTINUED | OUTPATIENT
Start: 2025-06-10 | End: 2025-06-18 | Stop reason: HOSPADM

## 2025-06-10 RX ORDER — NITROFURANTOIN 25; 75 MG/1; MG/1
100 CAPSULE ORAL EVERY 12 HOURS
Status: COMPLETED | OUTPATIENT
Start: 2025-06-10 | End: 2025-06-16

## 2025-06-10 RX ORDER — DOCUSATE SODIUM 100 MG/1
100 CAPSULE, LIQUID FILLED ORAL DAILY PRN
Status: DISCONTINUED | OUTPATIENT
Start: 2025-06-10 | End: 2025-06-10

## 2025-06-10 RX ORDER — IBUPROFEN 200 MG
1 TABLET ORAL DAILY PRN
Status: DISCONTINUED | OUTPATIENT
Start: 2025-06-10 | End: 2025-06-18 | Stop reason: HOSPADM

## 2025-06-10 RX ORDER — ONDANSETRON 4 MG/1
4 TABLET, ORALLY DISINTEGRATING ORAL EVERY 8 HOURS PRN
Status: DISCONTINUED | OUTPATIENT
Start: 2025-06-10 | End: 2025-06-18 | Stop reason: HOSPADM

## 2025-06-10 RX ORDER — BUSPIRONE HYDROCHLORIDE 5 MG/1
5 TABLET ORAL 2 TIMES DAILY
Status: DISCONTINUED | OUTPATIENT
Start: 2025-06-10 | End: 2025-06-13

## 2025-06-10 RX ORDER — ALUMINUM HYDROXIDE, MAGNESIUM HYDROXIDE, AND SIMETHICONE 1200; 120; 1200 MG/30ML; MG/30ML; MG/30ML
30 SUSPENSION ORAL EVERY 6 HOURS PRN
Status: DISCONTINUED | OUTPATIENT
Start: 2025-06-10 | End: 2025-06-10 | Stop reason: SDUPTHER

## 2025-06-10 RX ORDER — HYDROXYZINE PAMOATE 50 MG/1
50 CAPSULE ORAL NIGHTLY PRN
Status: DISCONTINUED | OUTPATIENT
Start: 2025-06-10 | End: 2025-06-10

## 2025-06-10 RX ORDER — IBUPROFEN 400 MG/1
400 TABLET, FILM COATED ORAL EVERY 6 HOURS PRN
Status: DISCONTINUED | OUTPATIENT
Start: 2025-06-10 | End: 2025-06-18 | Stop reason: HOSPADM

## 2025-06-10 RX ORDER — POTASSIUM CHLORIDE 20 MEQ/1
20 TABLET, EXTENDED RELEASE ORAL ONCE
Status: COMPLETED | OUTPATIENT
Start: 2025-06-10 | End: 2025-06-10

## 2025-06-10 RX ORDER — ALUMINUM HYDROXIDE, MAGNESIUM HYDROXIDE, AND SIMETHICONE 1200; 120; 1200 MG/30ML; MG/30ML; MG/30ML
30 SUSPENSION ORAL EVERY 6 HOURS PRN
Status: DISCONTINUED | OUTPATIENT
Start: 2025-06-10 | End: 2025-06-10

## 2025-06-10 RX ORDER — MUPIROCIN 20 MG/G
OINTMENT TOPICAL 2 TIMES DAILY
Status: DISPENSED | OUTPATIENT
Start: 2025-06-10 | End: 2025-06-15

## 2025-06-10 RX ORDER — GLUCAGON 1 MG
1 KIT INJECTION
Status: DISCONTINUED | OUTPATIENT
Start: 2025-06-10 | End: 2025-06-18 | Stop reason: HOSPADM

## 2025-06-10 RX ORDER — PROMETHAZINE HYDROCHLORIDE 25 MG/1
25 TABLET ORAL EVERY 6 HOURS PRN
Status: DISCONTINUED | OUTPATIENT
Start: 2025-06-10 | End: 2025-06-16

## 2025-06-10 RX ORDER — IBUPROFEN 200 MG
24 TABLET ORAL
Status: DISCONTINUED | OUTPATIENT
Start: 2025-06-10 | End: 2025-06-18 | Stop reason: HOSPADM

## 2025-06-10 RX ADMIN — PROPRANOLOL HYDROCHLORIDE 10 MG: 10 TABLET ORAL at 08:06

## 2025-06-10 RX ADMIN — INSULIN ASPART 1 UNITS: 100 INJECTION, SOLUTION INTRAVENOUS; SUBCUTANEOUS at 08:06

## 2025-06-10 RX ADMIN — NITROFURANTOIN MONOHYDRATE/MACROCRYSTALS 100 MG: 25; 75 CAPSULE ORAL at 08:06

## 2025-06-10 RX ADMIN — HYDROXYZINE PAMOATE 50 MG: 50 CAPSULE ORAL at 08:06

## 2025-06-10 RX ADMIN — BUSPIRONE HYDROCHLORIDE 5 MG: 5 TABLET ORAL at 08:06

## 2025-06-10 RX ADMIN — MUPIROCIN 1 G: 20 OINTMENT TOPICAL at 08:06

## 2025-06-10 RX ADMIN — NITROFURANTOIN MONOHYDRATE/MACROCRYSTALS 100 MG: 25; 75 CAPSULE ORAL at 10:06

## 2025-06-10 RX ADMIN — PROPRANOLOL HYDROCHLORIDE 10 MG: 10 TABLET ORAL at 11:06

## 2025-06-10 RX ADMIN — POTASSIUM CHLORIDE 20 MEQ: 1500 TABLET, EXTENDED RELEASE ORAL at 10:06

## 2025-06-10 RX ADMIN — IBUPROFEN 400 MG: 400 TABLET, FILM COATED ORAL at 10:06

## 2025-06-10 RX ADMIN — NICOTINE 1 PATCH: 14 PATCH, EXTENDED RELEASE TRANSDERMAL at 10:06

## 2025-06-10 RX ADMIN — BUSPIRONE HYDROCHLORIDE 5 MG: 5 TABLET ORAL at 09:06

## 2025-06-10 RX ADMIN — LURASIDONE HYDROCHLORIDE 20 MG: 20 TABLET, FILM COATED ORAL at 05:06

## 2025-06-10 RX ADMIN — INSULIN ASPART 4 UNITS: 100 INJECTION, SOLUTION INTRAVENOUS; SUBCUTANEOUS at 11:06

## 2025-06-10 NOTE — SUBJECTIVE & OBJECTIVE
Past Medical History:   Diagnosis Date    Bipolar 1 disorder     Diabetes mellitus        Past Surgical History:   Procedure Laterality Date    HYSTERECTOMY      partial       Review of patient's allergies indicates:   Allergen Reactions    Clonazepam Other (See Comments)    Codeine Itching    Cycline-250     Minocycline Other (See Comments)    Tylenol-codeine [acetaminophen-codeine] Itching       Current Facility-Administered Medications on File Prior to Encounter   Medication    [COMPLETED] metFORMIN tablet 1,000 mg    [COMPLETED] nitrofurantoin (macrocrystal-monohydrate) 100 MG capsule 100 mg    [COMPLETED] potassium chloride SA CR tablet 40 mEq    [DISCONTINUED] nicotine 21 mg/24 hr 1 patch     Current Outpatient Medications on File Prior to Encounter   Medication Sig    FLUoxetine 10 MG capsule Take 10 mg by mouth once daily.    meloxicam (MOBIC) 15 MG tablet Take 15 mg by mouth once daily.    metFORMIN (GLUCOPHAGE) 1000 MG tablet Take 1 tablet (1,000 mg total) by mouth 2 (two) times daily with meals.    mupirocin (BACTROBAN) 2 % ointment Apply 22 g topically 3 (three) times daily.    OXcarbazepine (TRILEPTAL) 300 MG Tab Take 1 tablet (300 mg total) by mouth once daily.    paliperidone palmitate (INVEGA SUSTENNA) 156 mg/mL Syrg injection Inject 1 mL (156 mg total) into the muscle every 28 days. for 30 doses    propranoloL (INDERAL) 10 MG tablet Take 10 mg by mouth 2 (two) times daily.    QUEtiapine (SEROQUEL) 100 MG Tab Take by mouth.    risperiDONE (RISPERDAL) 2 MG tablet Take 1 tablet (2 mg total) by mouth every evening.     Family History    None       Tobacco Use    Smoking status: Every Day     Current packs/day: 0.50     Types: Cigars, Cigarettes    Smokeless tobacco: Never   Substance and Sexual Activity    Alcohol use: No    Drug use: Yes     Types: Marijuana    Sexual activity: Not on file     Review of Systems   Constitutional:  Negative for chills, diaphoresis, fatigue and fever.   HENT:  Negative  for congestion, facial swelling and trouble swallowing.    Eyes: Negative.    Respiratory:  Negative for cough, chest tightness, shortness of breath and wheezing.    Cardiovascular:  Negative for chest pain, palpitations and leg swelling.   Gastrointestinal:  Negative for abdominal distention, abdominal pain, blood in stool, diarrhea, nausea and vomiting.   Endocrine: Negative.    Genitourinary: Negative.    Musculoskeletal:  Negative for arthralgias and gait problem.   Allergic/Immunologic: Negative.    Neurological:  Negative for dizziness, syncope and light-headedness.   Hematological: Negative.    Psychiatric/Behavioral:  Positive for behavioral problems and dysphoric mood.      Objective:     Vital Signs (Most Recent):  Temp: 97.8 °F (36.6 °C) (06/10/25 0728)  Pulse: 97 (06/10/25 0728)  Resp: 18 (06/10/25 0728)  BP: (!) 143/79 (06/10/25 0728)  SpO2: 99 % (06/10/25 0728) Vital Signs (24h Range):  Temp:  [97.8 °F (36.6 °C)-98.6 °F (37 °C)] 97.8 °F (36.6 °C)  Pulse:  [] 97  Resp:  [17-20] 18  SpO2:  [98 %-99 %] 99 %  BP: (143-160)/() 143/79     Weight: 60.6 kg (133 lb 9.6 oz)  Body mass index is 23.67 kg/m².     Physical Exam  Vitals and nursing note reviewed.   Constitutional:       Appearance: Normal appearance.   HENT:      Head: Normocephalic and atraumatic.      Nose: Nose normal.      Mouth/Throat:      Mouth: Mucous membranes are moist.      Pharynx: Oropharynx is clear.   Eyes:      Extraocular Movements: Extraocular movements intact.      Conjunctiva/sclera: Conjunctivae normal.      Pupils: Pupils are equal, round, and reactive to light.   Cardiovascular:      Rate and Rhythm: Normal rate and regular rhythm.      Pulses: Normal pulses.      Heart sounds: Normal heart sounds.   Pulmonary:      Effort: Pulmonary effort is normal.      Breath sounds: Normal breath sounds.   Abdominal:      General: Abdomen is flat. Bowel sounds are normal.      Palpations: Abdomen is soft.   Musculoskeletal:          General: Normal range of motion.      Cervical back: Normal range of motion and neck supple.   Skin:     General: Skin is warm and dry.      Capillary Refill: Capillary refill takes less than 2 seconds.      Comments: No rashes on limited skin exam.   Neurological:      General: No focal deficit present.      Mental Status: She is alert and oriented to person, place, and time.      Cranial Nerves: No cranial nerve deficit.      Comments: I Olfactory:  Sense of smell intact    II Optic:  Pupils equal round react to light.  Vision intact.    III, IV, VI, Ocular motor, Trochlear, Abducens:  Extraocular movements intact    V Trigeminal:  Facial sensation intact facial sensation intact,, muscles of mastication intact muscles of mastication intact, corneal reflex intact, corneal reflex intact    VII Facial:  Muscles of facial expression intact     VIII Vestibular cochlear: Hearing intact vestibular cochlear: Hearing intact    IX Glossopharyngeal:  Gag reflex intact.  Tasting intact.     X Vagus:  Gag reflex intact.    XI Spinal Accessory:  Shoulder shrug intact.  Head rotation intact.    XII Hypoglossal:  Tongue movements intact.     Psychiatric:         Mood and Affect: Mood is depressed.      Comments: Patient appears depressed              CRANIAL NERVES     CN III, IV, VI   Pupils are equal, round, and reactive to light.       Significant Labs: All pertinent labs within the past 24 hours have been reviewed.    Significant Imaging: I have reviewed all pertinent imaging results/findings within the past 24 hours.

## 2025-06-10 NOTE — H&P
"  [facility] Hospital Medicine  History & Physical    Patient Name: Sandrine Roland  MRN: [id number]  Patient Class: IP- Psych  Admission Date: 6/10/2025  Attending Physician: Richard Negro MD   Primary Care Provider: Malcolm Villafana MD         Patient information was obtained from patient, past medical records, and ER records.     Subjective:     Principal Problem:Bipolar I disorder    Chief Complaint: No chief complaint on file.       HPI:     Encounter Date: 6/9/2025        History           Chief Complaint   Patient presents with    Psychiatric Evaluation       Pt reports "I am bipolar and having some anxiety. Some of my medicines I wasn't taking. I am not hearing any voices but I can't remember things." Denies SI, HI, or hallucinations.       50-year-old female past medical history including bipolar disorder presents for evaluation of anxiety.  Patient says that she is prescribed 6 medications but isn't taking 3 of them, including Risperdal and Trileptal.  She says she used to see a psychiatrist and memory but isn't currently doing so.  She feels like she is not functioning well at home.  Her sister at bedside agrees.  Patient feels like she needs to be admitted for her mood.  She denies SI, HI, auditory or visual hallucinations.     The history is provided by the patient and a relative.           6/10/25:  Patient presented to ER for anxiety and loss of memory.  Not compliant with medications.  Admitted for further psychiatric treatment.     Past Medical History:   Diagnosis Date    Bipolar 1 disorder     Diabetes mellitus        Past Surgical History:   Procedure Laterality Date    HYSTERECTOMY      partial       Review of patient's allergies indicates:   Allergen Reactions    Clonazepam Other (See Comments)    Codeine Itching    Cycline-250     Minocycline Other (See Comments)    Tylenol-codeine [acetaminophen-codeine] Itching       Current " Facility-Administered Medications on File Prior to Encounter   Medication    [COMPLETED] metFORMIN tablet 1,000 mg    [COMPLETED] nitrofurantoin (macrocrystal-monohydrate) 100 MG capsule 100 mg    [COMPLETED] potassium chloride SA CR tablet 40 mEq    [DISCONTINUED] nicotine 21 mg/24 hr 1 patch     Current Outpatient Medications on File Prior to Encounter   Medication Sig    FLUoxetine 10 MG capsule Take 10 mg by mouth once daily.    meloxicam (MOBIC) 15 MG tablet Take 15 mg by mouth once daily.    metFORMIN (GLUCOPHAGE) 1000 MG tablet Take 1 tablet (1,000 mg total) by mouth 2 (two) times daily with meals.    mupirocin (BACTROBAN) 2 % ointment Apply 22 g topically 3 (three) times daily.    OXcarbazepine (TRILEPTAL) 300 MG Tab Take 1 tablet (300 mg total) by mouth once daily.    paliperidone palmitate (INVEGA SUSTENNA) 156 mg/mL Syrg injection Inject 1 mL (156 mg total) into the muscle every 28 days. for 30 doses    propranoloL (INDERAL) 10 MG tablet Take 10 mg by mouth 2 (two) times daily.    QUEtiapine (SEROQUEL) 100 MG Tab Take by mouth.    risperiDONE (RISPERDAL) 2 MG tablet Take 1 tablet (2 mg total) by mouth every evening.     Family History    None       Tobacco Use    Smoking status: Every Day     Current packs/day: 0.50     Types: Cigars, Cigarettes    Smokeless tobacco: Never   Substance and Sexual Activity    Alcohol use: No    Drug use: Yes     Types: Marijuana    Sexual activity: Not on file     Review of Systems   Constitutional:  Negative for chills, diaphoresis, fatigue and fever.   HENT:  Negative for congestion, facial swelling and trouble swallowing.    Eyes: Negative.    Respiratory:  Negative for cough, chest tightness, shortness of breath and wheezing.    Cardiovascular:  Negative for chest pain, palpitations and leg swelling.   Gastrointestinal:  Negative for abdominal distention, abdominal pain, blood in stool, diarrhea, nausea and vomiting.   Endocrine: Negative.    Genitourinary: Negative.     Musculoskeletal:  Negative for arthralgias and gait problem.   Allergic/Immunologic: Negative.    Neurological:  Negative for dizziness, syncope and light-headedness.   Hematological: Negative.    Psychiatric/Behavioral:  Positive for behavioral problems and dysphoric mood.      Objective:     Vital Signs (Most Recent):  Temp: 97.8 °F (36.6 °C) (06/10/25 0728)  Pulse: 97 (06/10/25 0728)  Resp: 18 (06/10/25 0728)  BP: (!) 143/79 (06/10/25 0728)  SpO2: 99 % (06/10/25 0728) Vital Signs (24h Range):  Temp:  [97.8 °F (36.6 °C)-98.6 °F (37 °C)] 97.8 °F (36.6 °C)  Pulse:  [] 97  Resp:  [17-20] 18  SpO2:  [98 %-99 %] 99 %  BP: (143-160)/() 143/79     Weight: 60.6 kg (133 lb 9.6 oz)  Body mass index is 23.67 kg/m².     Physical Exam  Vitals and nursing note reviewed.   Constitutional:       Appearance: Normal appearance.   HENT:      Head: Normocephalic and atraumatic.      Nose: Nose normal.      Mouth/Throat:      Mouth: Mucous membranes are moist.      Pharynx: Oropharynx is clear.   Eyes:      Extraocular Movements: Extraocular movements intact.      Conjunctiva/sclera: Conjunctivae normal.      Pupils: Pupils are equal, round, and reactive to light.   Cardiovascular:      Rate and Rhythm: Normal rate and regular rhythm.      Pulses: Normal pulses.      Heart sounds: Normal heart sounds.   Pulmonary:      Effort: Pulmonary effort is normal.      Breath sounds: Normal breath sounds.   Abdominal:      General: Abdomen is flat. Bowel sounds are normal.      Palpations: Abdomen is soft.   Musculoskeletal:         General: Normal range of motion.      Cervical back: Normal range of motion and neck supple.   Skin:     General: Skin is warm and dry.      Capillary Refill: Capillary refill takes less than 2 seconds.      Comments: No rashes on limited skin exam.   Neurological:      General: No focal deficit present.      Mental Status: She is alert and oriented to person, place, and time.      Cranial Nerves: No  "cranial nerve deficit.      Comments: I Olfactory:  Sense of smell intact    II Optic:  Pupils equal round react to light.  Vision intact.    III, IV, VI, Ocular motor, Trochlear, Abducens:  Extraocular movements intact    V Trigeminal:  Facial sensation intact facial sensation intact,, muscles of mastication intact muscles of mastication intact, corneal reflex intact, corneal reflex intact    VII Facial:  Muscles of facial expression intact     VIII Vestibular cochlear: Hearing intact vestibular cochlear: Hearing intact    IX Glossopharyngeal:  Gag reflex intact.  Tasting intact.     X Vagus:  Gag reflex intact.    XI Spinal Accessory:  Shoulder shrug intact.  Head rotation intact.    XII Hypoglossal:  Tongue movements intact.     Psychiatric:         Mood and Affect: Mood is depressed.      Comments: Patient appears depressed              CRANIAL NERVES     CN III, IV, VI   Pupils are equal, round, and reactive to light.       Significant Labs: All pertinent labs within the past 24 hours have been reviewed.    Significant Imaging: I have reviewed all pertinent imaging results/findings within the past 24 hours.  Assessment/Plan:     Assessment & Plan  Bipolar I disorder  6/10/25: Per inpatient psych recommendations.     Type 2 diabetes mellitus  Patient's FSGs are controlled on current medication regimen.  Last A1c reviewed- No results found for: "LABA1C", "HGBA1C"  Most recent fingerstick glucose reviewed- No results for input(s): "POCTGLUCOSE" in the last 24 hours.  Current correctional scale  Low  Maintain anti-hyperglycemic dose as follows-   Antihyperglycemics (From admission, onward)      Start     Stop Route Frequency Ordered    06/10/25 1047  insulin aspart U-100 pen 0-5 Units         -- SubQ Before meals & nightly PRN 06/10/25 0947          Hold Oral hypoglycemics while patient is in the hospital.  Hypokalemia  Patient's most recent potassium results are listed below.   Recent Labs     06/09/25  2141   K " 3.3*     Plan  - Replete potassium per protocol    UTI (urinary tract infection)  6/10/25:  Start marobid.       VTE Risk Mitigation (From admission, onward)      None                            VIOLETTE Adair  Department of Hospital Medicine  [facility] ([facility])

## 2025-06-10 NOTE — ASSESSMENT & PLAN NOTE
"Patient's FSGs are controlled on current medication regimen.  Last A1c reviewed- No results found for: "LABA1C", "HGBA1C"  Most recent fingerstick glucose reviewed- No results for input(s): "POCTGLUCOSE" in the last 24 hours.  Current correctional scale  Low  Maintain anti-hyperglycemic dose as follows-   Antihyperglycemics (From admission, onward)      Start     Stop Route Frequency Ordered    06/10/25 1047  insulin aspart U-100 pen 0-5 Units         -- SubQ Before meals & nightly PRN 06/10/25 0947          Hold Oral hypoglycemics while patient is in the hospital.  "

## 2025-06-10 NOTE — ED PROVIDER NOTES
"Encounter Date: 6/9/2025       History     Chief Complaint   Patient presents with    Psychiatric Evaluation     Pt reports "I am bipolar and having some anxiety. Some of my medicines I wasn't taking. I am not hearing any voices but I can't remember things." Denies SI, HI, or hallucinations.      50-year-old female past medical history including bipolar disorder presents for evaluation of anxiety.  Patient says that she is prescribed 6 medications but isn't taking 3 of them, including Risperdal and Trileptal.  She says she used to see a psychiatrist and memory but isn't currently doing so.  She feels like she is not functioning well at home.  Her sister at bedside agrees.  Patient feels like she needs to be admitted for her mood.  She denies SI, HI, auditory or visual hallucinations.    The history is provided by the patient and a relative.     Review of patient's allergies indicates:   Allergen Reactions    Cycline-250     Minocycline Other (See Comments)    Tylenol-codeine [acetaminophen-codeine] Itching     Past Medical History:   Diagnosis Date    Bipolar 1 disorder     Diabetes mellitus      Past Surgical History:   Procedure Laterality Date    HYSTERECTOMY      partial     No family history on file.  Social History[1]  Review of Systems    Physical Exam     Initial Vitals [06/09/25 2059]   BP Pulse Resp Temp SpO2   (!) 160/114 106 17 98.6 °F (37 °C) 99 %      MAP       --         Physical Exam    Nursing note and vitals reviewed.  Constitutional: She appears well-developed. She is not diaphoretic. No distress.   HENT:   Head: Normocephalic and atraumatic.   Eyes: EOM are normal. Pupils are equal, round, and reactive to light.   Neck:   Normal range of motion.  Cardiovascular:  Normal rate.           Pulmonary/Chest: No respiratory distress.   Abdominal: She exhibits no distension.   Musculoskeletal:         General: Normal range of motion.      Cervical back: Normal range of motion.     Neurological: She is " alert and oriented to person, place, and time.   Skin: Skin is warm and dry.   Psychiatric: Her mood appears anxious. Her speech is rapid and/or pressured and tangential.         ED Course   Procedures  Labs Reviewed   COMPREHENSIVE METABOLIC PANEL - Abnormal       Result Value    Sodium 134 (*)     Potassium 3.3 (*)     Chloride 98      CO2 19 (*)     Glucose 274 (*)     BUN 4 (*)     Creatinine 0.4 (*)     Calcium 9.5      Protein Total 8.2      Albumin 5.0      Bilirubin Total 0.9      ALP 80      AST 20      ALT 23      Anion Gap 17 (*)     eGFR >60     URINALYSIS, REFLEX TO URINE CULTURE - Abnormal    Color, UA Yellow      Appearance, UA Clear      pH, UA 6.0      Spec Grav UA <=1.005 (*)     Protein, UA Negative      Glucose, UA 3+ (*)     Ketones, UA 3+ (*)     Bilirubin, UA Negative      Blood, UA 1+ (*)     Nitrites, UA Negative      Urobilinogen, UA Negative      Leukocyte Esterase, UA Negative     ACETAMINOPHEN LEVEL - Abnormal    Acetaminophen Level <10.0 (*)    CBC WITH DIFFERENTIAL - Abnormal    WBC 16.04 (*)     RBC 4.43      HGB 14.8      HCT 40.5      MCV 91      MCH 33.4 (*)     MCHC 36.5 (*)     RDW 11.8      Platelet Count 230      MPV 9.7      Nucleated RBC 0      Neut % 63.9      Lymph % 28.2      Mono % 5.6      Eos % 1.6      Basophil % 0.4      Imm Grans % 0.3      Neut # 10.25 (*)     Lymph # 4.53      Mono # 0.90      Eos # 0.25      Baso # 0.06      Imm Grans # 0.05 (*)    URINALYSIS MICROSCOPIC - Abnormal    RBC, UA 0      WBC, UA 0      Bacteria, UA Moderate (*)     Yeast, UA None      Microscopic Comment       DRUG SCREEN PANEL, URINE EMERGENCY - Normal    Benzodiazepine, Urine Negative      Methadone, Urine Negative      Cocaine, Urine Negative      Opiates, Urine Negative      Barbiturates, Urine Negative      Amphetamines, Urine Negative      THC Negative      Phencyclidine, Urine Negative      Urine Creatinine 32.1      Narrative:     This screen includes the following classes of  drugs at the   listed cut-off:      Amph =999 ng/ml  Millicent  =199 ng/ml  Rick =199 ng/ml  THC =49.9 ng/ml  COCM =299 ng/ml  METD =299 ng/ml  OP =299 ng/ml  PCP = 24.9 ng/ml    High concentrations of Diphenhydramine may cross-react with  Phencyclidine PCP screening immunoassay giving a false   positive result.    High concentrations of Methylenedioxymethamphetamine (MDMA aka  Ectasy) and other structurally similar compounds may cross-   react with the Amphetamine/Methamphetamine screening   immunoassay giving a false positive result.    A metabolite of the anti-HIV drug Sustiva () may cause  false positive results in the Marijuana metabolite (THC)   screening assay.    Note: This exception list includes only more common   interferants in toxicology screen testing.  Because of many   cross-reactants, positive results on toxicology drug screens   should be confirmed whenever results do not correlate with   clinical presentation.    This report is intended for use in clinical monitoring and  management of patients. It is not intended for use in   employment related drug testing.    Presumptive positive results are unconfirmed and may be used   only for medical purposes.    Assay Intended Use: This assay provides only a preliminary analytical  test result. A more specific alternate chemical method must be used  to obtain a confirmed analytical result. Gas chromatography/mass  spectrometry (GS/MS)is the preferred confirmatory method. Clinical  consideration and professional judgement should be applied to any   drug of abuse test result, particularly when preliminary results  are used.   ALCOHOL,MEDICAL (ETHANOL) - Normal    Alcohol, Serum <10     CBC W/ AUTO DIFFERENTIAL    Narrative:     The following orders were created for panel order CBC auto differential.  Procedure                               Abnormality         Status                     ---------                               -----------         ------                      CBC with Differential[[id number]]       Abnormal            Final result                 Please view results for these tests on the individual orders.   GREY TOP URINE HOLD          Imaging Results    None          Medications   nicotine 21 mg/24 hr 1 patch (1 patch Transdermal Patch Applied 6/9/25 2154)   potassium chloride SA CR tablet 40 mEq (has no administration in time range)     Medical Decision Making  50-year-old female past medical history as above presents for evaluation of anxiety, in the setting of diagnosis of bipolar disorder not currently taking all of her prescribed medications.  Patient and sister feel she needs psychiatric admission due to being gravely disabled. PEC filed and pt placed on 1:1 obs. Medically cleared for inpatient psychiatric management.     Amount and/or Complexity of Data Reviewed  Independent Historian:      Details: Sister at bedside  External Data Reviewed: notes.     Details: Admitted July 2024 to psychiatric facility  Labs: ordered. Decision-making details documented in ED Course.    Risk  OTC drugs.  Prescription drug management.  Decision regarding hospitalization.  Diagnosis or treatment significantly limited by social determinants of health.               ED Course as of 06/09/25 2253   Mon Jun 09, 2025 2226 Drug screen panel, emergency [AT]   2228 Alcohol, Serum: <10 [AT]   2228 Urinalysis Microscopic(!)  Discussed with patient, says she has urinary frequency and would treatment. [AT]   2228 Potassium(!): 3.3  Given repletion [AT]   2228 WBC(!): 16.04  Mild leukocytosis [AT]      ED Course User Index  [AT] Rocio Johnson MD       Medically cleared for psychiatry placement: 6/9/2025 10:25 PM                   Clinical Impression:  Final diagnoses:  [Z00.8] Medical clearance for psychiatric admission (Primary)  [F41.9] Anxiety  [E87.6] Hypokalemia          ED Disposition Condition    Transfer to Psych Facility Stable          ED Prescriptions     None       Follow-up Information    None                    [1]   Social History  Tobacco Use    Smoking status: Every Day     Current packs/day: 0.50     Types: Cigars, Cigarettes    Smokeless tobacco: Never   Substance Use Topics    Alcohol use: No    Drug use: Yes     Types: Marijuana        Rocio Johnson MD  06/09/25 6085

## 2025-06-10 NOTE — ASSESSMENT & PLAN NOTE
Patient's most recent potassium results are listed below.   Recent Labs     06/09/25  2141   K 3.3*     Plan  - Replete potassium per protocol

## 2025-06-10 NOTE — NURSING
Patient c/o toothache, received prn pain medication as ordered, see emar. Patient states medication is somewhat effective upon follow up.

## 2025-06-10 NOTE — ED NOTES
Pt ambulatory to ER rm 11 with steady gait, accompanied by security.  RR= and not labored, NADN. Pt calm and cooperative at this time.
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
## 2025-06-10 NOTE — HPI
"  Encounter Date: 6/9/2025        History           Chief Complaint   Patient presents with    Psychiatric Evaluation       Pt reports "I am bipolar and having some anxiety. Some of my medicines I wasn't taking. I am not hearing any voices but I can't remember things." Denies SI, HI, or hallucinations.       50-year-old female past medical history including bipolar disorder presents for evaluation of anxiety.  Patient says that she is prescribed 6 medications but isn't taking 3 of them, including Risperdal and Trileptal.  She says she used to see a psychiatrist and memory but isn't currently doing so.  She feels like she is not functioning well at home.  Her sister at bedside agrees.  Patient feels like she needs to be admitted for her mood.  She denies SI, HI, auditory or visual hallucinations.     The history is provided by the patient and a relative.           6/10/25:  Patient presented to ER for anxiety and loss of memory.  Not compliant with medications.  Admitted for further psychiatric treatment.   "

## 2025-06-10 NOTE — PLAN OF CARE
Problem: Adult Behavioral Health Plan of Care  Goal: Optimized Coping Skills in Response to Life Stressors  Intervention: Promote Effective Coping Strategies  Flowsheets (Taken 6/10/2025 [id number])  Supportive Measures:   active listening utilized   counseling provided   self-responsibility promoted   verbalization of feelings encouraged   relaxation techniques promoted   self-care encouraged   self-reflection promoted   goal-setting facilitated     Behavior:  Patient arrived on time and was actively engaged in conversation. Patient shared at most opportunities and engaged appropriately with peers. Patient presented as alert and oriented, although she appeared visibly anxious and tearful at times.    Intervention:  Counselor facilitated a group discussion on the importance of self-care, highlighting strategies such as mindfulness, setting boundaries, and prioritizing personal well-being. Participants were encouraged to identify and share self-care activities that have been beneficial for them. Participants were encouraged to identify areas that they have not been interacting with or areas that have room for improvement.    Response:  The patient responded moderately well to the intervention. Patient appeared receptive to the information presented. She was observed documenting her responses on the worksheet provided. Patient expressed interest in implementing specific self-care activities discussed during the session. She shared that she would be willing to practice more psychological and emotional self-care as this has become a neglected area as of late. Patient shared a desire to engage in most of the activities discussed, but finds that she lacks the motivation and energy to do so.     Plan:  Patient will be encouraged to participate in MET/CBT- based groups 2-5 times per week and/or 1:1 as needed to address issues and coping strategies. Patient will participate in recreational groups 2-5 times per week and/or 1:1 as  needed to build peer support and relaxation skills.

## 2025-06-10 NOTE — ED NOTES
Assumed care of patient from MARSHA Louise. Patient resting quietly in room. Sitter remains with patient. Will continue to monitor patient.

## 2025-06-10 NOTE — PLAN OF CARE
Problem: Adult Behavioral Health Plan of Care  Goal: Plan of Care Review  Outcome: Ongoing  Goal: Patient-Specific Goal (Individualization)  Outcome: Ongoing  Goal: Adheres to Safety Considerations for Self and Others  Outcome: Ongoing  Goal: Absence of New-Onset Illness or Injury  Outcome: Ongoing  Goal: Optimized Coping Skills in Response to Life Stressors  Outcome: Ongoing  Goal: Develops/Participates in Therapeutic Mays to Support Successful Transition  Outcome: Ongoing  Goal: Rounds/Family Conference  Outcome: Ongoing

## 2025-06-10 NOTE — PLAN OF CARE
50-year-old female past medical history including bipolar disorder and anxiety issues, presents for evaluation of anxiety and psychiatric photosensitizations.  Patient says that she is prescribed 6 medications but isn't taking 3 of them, including Risperdal and Trileptal. She says she used to see a psychiatrist but isn't currently not doing so. She feels like she is not functioning well at home due to issues with her family and ex-boyfriend.  Patient admits to a recent break up with her boyfriend which has played a big part in her anxiety.  She also admits to ongoing concerns of not getting along with her family, because of  boyfriend.  Patient says that she was placed on Propanolol for anxiety, but the medicine does not help control her anxiety.  Patient expressed that previous medications such as Lorazepam has worked for her.  Patient does not work.  She is trying to move back with her sister.  She had a light snack and went to bed.  No further concerns.

## 2025-06-10 NOTE — CONSULTS
RD consulted for new admit. Spoke with RN via phone and RN stated that the pt has no dietary issues at this time. Pt is tolerating a Regular diet and is consuming % of all meals and snacks. Noted pt's hx of diabetes. Will switch diet order to Consistent CHO diet as per RN. Further nutrition services are not warranted at this time. RD to sign off. Please re-consult if any nutrition/dietary issues arise.

## 2025-06-11 LAB
CHOLEST SERPL-MCNC: 211 MG/DL (ref 120–199)
CHOLEST/HDLC SERPL: 5.9 {RATIO} (ref 2–5)
EAG (OHS): 240 MG/DL (ref 68–131)
HBA1C MFR BLD: 10 % (ref 4–5.6)
HDLC SERPL-MCNC: 36 MG/DL (ref 40–75)
HDLC SERPL: 17.1 % (ref 20–50)
LDLC SERPL CALC-MCNC: 139.8 MG/DL (ref 63–159)
NONHDLC SERPL-MCNC: 175 MG/DL
POCT GLUCOSE: 200 MG/DL (ref 70–110)
POCT GLUCOSE: 278 MG/DL (ref 70–110)
POCT GLUCOSE: 281 MG/DL (ref 70–110)
POCT GLUCOSE: 284 MG/DL (ref 70–110)
TRIGL SERPL-MCNC: 176 MG/DL (ref 30–150)

## 2025-06-11 PROCEDURE — 36415 COLL VENOUS BLD VENIPUNCTURE: CPT | Performed by: PSYCHIATRY & NEUROLOGY

## 2025-06-11 PROCEDURE — 83036 HEMOGLOBIN GLYCOSYLATED A1C: CPT | Performed by: PSYCHIATRY & NEUROLOGY

## 2025-06-11 PROCEDURE — 25000003 PHARM REV CODE 250: Performed by: PSYCHIATRY & NEUROLOGY

## 2025-06-11 PROCEDURE — 99232 SBSQ HOSP IP/OBS MODERATE 35: CPT | Mod: HB,SA,, | Performed by: PSYCHIATRY & NEUROLOGY

## 2025-06-11 PROCEDURE — 25000003 PHARM REV CODE 250: Performed by: NURSE PRACTITIONER

## 2025-06-11 PROCEDURE — 11400000 HC PSYCH PRIVATE ROOM

## 2025-06-11 PROCEDURE — 80061 LIPID PANEL: CPT | Performed by: PSYCHIATRY & NEUROLOGY

## 2025-06-11 PROCEDURE — 90833 PSYTX W PT W E/M 30 MIN: CPT | Mod: HB,SA,, | Performed by: PSYCHIATRY & NEUROLOGY

## 2025-06-11 PROCEDURE — S4991 NICOTINE PATCH NONLEGEND: HCPCS | Performed by: PSYCHIATRY & NEUROLOGY

## 2025-06-11 RX ORDER — TRAZODONE HYDROCHLORIDE 50 MG/1
50 TABLET ORAL NIGHTLY
Status: DISCONTINUED | OUTPATIENT
Start: 2025-06-11 | End: 2025-06-12

## 2025-06-11 RX ORDER — ADHESIVE BANDAGE
30 BANDAGE TOPICAL DAILY PRN
Status: DISCONTINUED | OUTPATIENT
Start: 2025-06-11 | End: 2025-06-18 | Stop reason: HOSPADM

## 2025-06-11 RX ORDER — GABAPENTIN 100 MG/1
100 CAPSULE ORAL 3 TIMES DAILY
Status: DISCONTINUED | OUTPATIENT
Start: 2025-06-11 | End: 2025-06-12

## 2025-06-11 RX ORDER — LURASIDONE HYDROCHLORIDE 40 MG/1
40 TABLET, FILM COATED ORAL
Status: DISCONTINUED | OUTPATIENT
Start: 2025-06-11 | End: 2025-06-15

## 2025-06-11 RX ADMIN — BUSPIRONE HYDROCHLORIDE 5 MG: 5 TABLET ORAL at 08:06

## 2025-06-11 RX ADMIN — PROPRANOLOL HYDROCHLORIDE 10 MG: 10 TABLET ORAL at 09:06

## 2025-06-11 RX ADMIN — GABAPENTIN 100 MG: 100 CAPSULE ORAL at 02:06

## 2025-06-11 RX ADMIN — HYDROXYZINE PAMOATE 50 MG: 50 CAPSULE ORAL at 09:06

## 2025-06-11 RX ADMIN — INSULIN ASPART 3 UNITS: 100 INJECTION, SOLUTION INTRAVENOUS; SUBCUTANEOUS at 04:06

## 2025-06-11 RX ADMIN — TRAZODONE HYDROCHLORIDE 50 MG: 50 TABLET ORAL at 09:06

## 2025-06-11 RX ADMIN — LURASIDONE HYDROCHLORIDE 40 MG: 40 TABLET, FILM COATED ORAL at 04:06

## 2025-06-11 RX ADMIN — BUSPIRONE HYDROCHLORIDE 5 MG: 5 TABLET ORAL at 09:06

## 2025-06-11 RX ADMIN — NITROFURANTOIN MONOHYDRATE/MACROCRYSTALS 100 MG: 25; 75 CAPSULE ORAL at 08:06

## 2025-06-11 RX ADMIN — GABAPENTIN 100 MG: 100 CAPSULE ORAL at 09:06

## 2025-06-11 RX ADMIN — IBUPROFEN 400 MG: 400 TABLET, FILM COATED ORAL at 09:06

## 2025-06-11 RX ADMIN — INSULIN ASPART 3 UNITS: 100 INJECTION, SOLUTION INTRAVENOUS; SUBCUTANEOUS at 12:06

## 2025-06-11 RX ADMIN — PROPRANOLOL HYDROCHLORIDE 10 MG: 10 TABLET ORAL at 08:06

## 2025-06-11 RX ADMIN — NICOTINE 1 PATCH: 14 PATCH, EXTENDED RELEASE TRANSDERMAL at 08:06

## 2025-06-11 RX ADMIN — INSULIN ASPART 1 UNITS: 100 INJECTION, SOLUTION INTRAVENOUS; SUBCUTANEOUS at 10:06

## 2025-06-11 RX ADMIN — NITROFURANTOIN MONOHYDRATE/MACROCRYSTALS 100 MG: 25; 75 CAPSULE ORAL at 09:06

## 2025-06-11 NOTE — NURSING
Pt. Was noticed to be in the dining room watching TV with other Pts. She denied auditory or visual hallucinations.Pt. denied suicidal or homicidal ideations. She did c/o anxiety at times. Pt. Took her medications without diff. Will cont. To monitor Pt.

## 2025-06-11 NOTE — PLAN OF CARE
Problem: Adult Behavioral Health Plan of Care  Goal: Develops/Participates in Therapeutic [place] to Support Successful Transition  Intervention: Mutually Develop Transition Plan  Flowsheets (Taken 6/11/2025 9599)  Current Discharge Risk:   psychiatric illness   substance use/abuse  Readmission Within the Last 30 Days: no previous admission in last 30 days  Outpatient/Agency/Support Group Needs:   12 step program (specify)   outpatient counseling   outpatient medication management  Transition Support:   community resources reviewed   crisis management plan promoted   follow-up care discussed  Anticipated Discharge Disposition: home or self-care  Transportation Concerns: no car  Patient/Family Anticipated Services at Transition: mental health services  Patient/Family Anticipates Transition to: home with family  Transportation Anticipated: family or friend will provide  Concerns to be Addressed:   discharge planning   medication   mental health   coping/stress   decision-making   substance/tobacco abuse/use   cognitive/perceptual  Offered/Gave Vendor List: yes

## 2025-06-11 NOTE — PLAN OF CARE
POC reviewed this shift and is on going. Patient is withdrawn, cooperative, and anxious.  Denies Suicidal Ideation, Homicidal Ideation, Auditory Hallucinations, Visual Hallucinations, Tactile Hallucinations, Gustatory Hallucinations, and Delusions. Interacts well with select peers. Self isolates at times. Pt continues to be medication compliant and staff will continue to monitor pt closely while on the unit.

## 2025-06-11 NOTE — PLAN OF CARE
Problem: Adult Behavioral Health Plan of Care  Goal: Optimized Coping Skills in Response to Life Stressors  Intervention: Promote Effective Coping Strategies  Flowsheets (Taken 6/11/2025 1640)  Supportive Measures:   active listening utilized   counseling provided   decision-making supported   goal-setting facilitated   positive reinforcement provided   problem-solving facilitated   relaxation techniques promoted   self-reflection promoted   self-responsibility promoted   verbalization of feelings encouraged     Behavior:  Patient arrived on time and was actively engaged in group conversation and activity. Patient shared at most opportunities, asked relevant questions, and engaged appropriately with peers. She presented as alert and oriented, although anxious. Roughly 45 minutes into session, patient asked to go to the bathroom and did not return.     Intervention:  Counselor led a psychoeducational discussion on the nature of emotional and environmental triggers, followed by a group activity identifying common personal triggers and coping strategies. Counselor led discussion on strategies for avoiding or reducing exposure to triggers and strategies for dealing with triggers head on when they cannot be avoided. Participants were encouraged to participate in activity sharing ideas for handling various different triggers.     Response:  The patient responded well to the intervention. She defined her primary triggers as guilt, shame, and feeling like a disappointment. Patient demonstrated insight into how certain situations influence both emotional state and behavior. Patient expressed relief at hearing her peers voice similar triggers. She expressed motivation to apply learned strategies in her daily life.      Plan:  Patient will be encouraged to participate in MET/CBT- based groups 2-5 times per week and/or 1:1 as needed to address issues and coping strategies. Patient will participate in recreational groups 2-5  times per week and/or 1:1 as needed to build peer support and relaxation skills.

## 2025-06-11 NOTE — NURSING
Plan of care reviewed.  Pt has spent the majority of the day in her bedroom. Only out of room for meals.  Pt with no interaction with peers or staff unless prompted.  Pt remains anxious and states that has been her main problem at home.  Pt states she is working on getting her kids back also but would not go into any more details concerning this issue.  Pt denies si, hi or a v hallucinations.  Gravely disabled.  Pt taking meds without side effects noted.  Apppetite adequate.  Pt instructed to call for any needs or concerns at all.  Verbalized understanding. Will cont to monitor for safety.  Patient care ongoing.

## 2025-06-11 NOTE — PLAN OF CARE
Problem: Adult Behavioral Health Plan of Care  Goal: Plan of Care Review  Outcome: Progressing  Goal: Patient-Specific Goal (Individualization)  Outcome: Progressing  Goal: Adheres to Safety Considerations for Self and Others  Outcome: Progressing  Goal: Absence of New-Onset Illness or Injury  Outcome: Progressing  Goal: Optimized Coping Skills in Response to Life Stressors  Outcome: Progressing  Goal: Develops/Participates in Therapeutic [place] to Support Successful Transition  Outcome: Progressing  Goal: Rounds/Family Conference  Outcome: Progressing     Problem: Anxiety Signs/Symptoms  Goal: Optimized Energy Level (Anxiety Signs/Symptoms)  Outcome: Progressing  Goal: Optimized Cognitive Function (Anxiety Signs/Symptoms)  Outcome: Progressing  Goal: Improved Somatic Symptoms (Anxiety Signs/Symptoms)  Outcome: Progressing     Problem: Coping Ineffective  Goal: Effective Coping  Outcome: Progressing

## 2025-06-11 NOTE — PLAN OF CARE
06/11/25 1431   Step 1: Warning Signs   Warning Sign 1 Feeling worried all of the time   Warning Sign 2 Not wanting to take care of my responsibilities   Warning Sign 3 Worsening anxiety symptoms   Step 2: Internal coping strategies - Things I can do to take my mind off my problems without contacting another person:   Coping Strategy 1 Smoke a cigarette   Coping Strategy 2 [place]   Coping Strategy 3 Go for a walk   Step 3: People and social settings that provide distraction:   1. Name Stella Decker (sister)       Phone [phone]   2. Name Arash (brother)       Phone on cell phone   3. Place Home   4. Place By the water    Step 4: People whom I can ask for help:   1. Person Stella Decker (sister)       Phone [phone]   2. Person Arash (brother)       Phone on cell phone   3. Person Rosalie (sister)       Phone on cell phone   Step 5: Professionals or agencies I can contact during a crisis:   1. Clinician Name [facility]       Phone [phone]   2. Clinician Name [facility]       Phone [phone]   3. Suicide Prevention Lifeline: 988 Suicide Prevention Lifeline: 988   4. [facility]       [facility]       Emergency Service Address [address]       Emergency Services Phone [phone]   Step 6: Making the environment safer (plan for lethal means safety)   Safe Environment Plan Contact professional assistance and take medications as prescribed.   Safe Environment Optional: What is most important to me and worth living for? My family and making more happy memories.   Safety Plan Tasks   Provided a Hard Copy to the Patient Y   Explained How to Follow the Steps Y   Discussed Facilitators and Barriers Y
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
## 2025-06-11 NOTE — PROGRESS NOTES
"PSYCHIATRY DAILY INPATIENT PROGRESS NOTE  SUBSEQUENT HOSPITAL VISIT    ENCOUNTER DATE: 6/11/2025  SITE: [facility]    DATE OF ADMISSION: 6/10/2025  1:45 AM  LENGTH OF STAY: 1 days      CHIEF COMPLAINT   Sandrine Roland is a 50 y.o. female, seen during daily smith rounds on the inpatient unit.  Sandrine Roland presented with the chief complaint of anxiety, "I was having anxiety attacks and couldn't breathe."      The patient was seen and examined. The chart was reviewed.     Reviewed notes from Rns and MD and labs from the last 24 hours.    The patient's case was discussed with the treatment team/care providers today including Rns and MD    Staff reports no behavioral or management issues.     The patient has been compliant with treatment.      Subjective 06/11/2025       Today the patient reports patient reports ongoing anxiety with racing thoughts.  Reports sleeping poorly last night, tossing and turning.  Discussed previous medication trials regarding sleep aids.  She is amenable to trial of trazodone this evening.  Patient also notes today that she has been prescribed propanolol for several months for anxiety which she has found to be mostly ineffective.  She is amenable to tapering propanolol and starting off-label trial of gabapentin.  She currently denies suicidal or homicidal ideations.  She states that she does not feel quite back to normal yet but better than yesterday.  Of note, the patient does demonstrate some increased response time to assessment questions and thought blocking.    Symptoms persist and remain impairing requiring ongoing inpatient hospitalization      The patient denies any side effects to medications.        Interim/overnight events per report/notes:          Psychiatric ROS (observed, reported, or endorsed/denied):  Depressed mood - Yes  Interest/pleasure/anhedonia: Yes  Guilt/hopelessness/worthlessness - Yes  Changes in Sleep - Yes  Changes in Appetite - " No  Changes in Concentration - No  Changes in Energy - Yes  PMA/R- No  Suicidal- active/passive ideations - No  Homicidal ideations: active/passive ideations - No    Hallucinations - No  Delusions - No  Disorganized behavior - No  Disorganized speech - No  Negative symptoms - No    Elevated mood - No  Decreased need for sleep - No  Grandiosity - No  Racing thoughts - No  Impulsivity - No  Irritability- No  Increased energy - No  Distractibility - No  Increase in goal-directed activity or PMA- No    Symptoms of ZEE - Yes  Symptoms of Panic Disorder- No  Symptoms of PTSD - No        Overall progress: Patient is showing no improvement on the Unit to date        Psychotherapy:  Target symptoms: anxiety   Why chosen therapy is appropriate versus another modality: relevant to diagnosis, evidence based practice  Outcome monitoring methods: self-report, observation  Therapeutic intervention type: insight oriented psychotherapy, supportive psychotherapy  Topics discussed/themes: building skills sets for symptom management, symptom recognition  The patient's response to the intervention is accepting. The patient's progress toward treatment goals is good.   Duration of intervention: 17 minutes.        Medical ROS  Review of Systems   Constitutional: Negative.    HENT: Negative.     Eyes: Negative.    Respiratory: Negative.     Cardiovascular: Negative.    Gastrointestinal: Negative.    Genitourinary: Negative.    Musculoskeletal: Negative.    Skin: Negative.    Neurological: Negative.    Endo/Heme/Allergies: Negative.    Psychiatric/Behavioral:  The patient is nervous/anxious.          PAST MEDICAL HISTORY   Past Medical History:   Diagnosis Date    Bipolar 1 disorder     Diabetes mellitus            PSYCHOTROPIC MEDICATIONS   Scheduled Meds:   busPIRone  5 mg Oral BID    lurasidone  20 mg Oral with dinner    mupirocin   Nasal BID    nitrofurantoin (macrocrystal-monohydrate)  100 mg Oral Q12H    propranoloL  10 mg Oral BID  "    Continuous Infusions:  PRN Meds:.  Current Facility-Administered Medications:     aluminum-magnesium hydroxide-simethicone, 30 mL, Oral, Q6H PRN    benzonatate, 100 mg, Oral, TID PRN    benztropine mesylate, 2 mg, Intramuscular, Q8H PRN    dextrose 50%, 12.5 g, Intravenous, PRN    dextrose 50%, 25 g, Intravenous, PRN    glucagon (human recombinant), 1 mg, Intramuscular, PRN    glucose, 16 g, Oral, PRN    glucose, 24 g, Oral, PRN    hydrOXYzine pamoate, 50 mg, Oral, Q6H PRN    ibuprofen, 400 mg, Oral, Q6H PRN    insulin aspart U-100, 0-5 Units, Subcutaneous, QID (AC + HS) PRN    loperamide, 2 mg, Oral, PRN    nicotine, 1 patch, Transdermal, Daily PRN    OLANZapine, 10 mg, Oral, Q8H PRN **AND** OLANZapine, 10 mg, Intramuscular, Q8H PRN    ondansetron, 4 mg, Oral, Q8H PRN    promethazine, 25 mg, Oral, Q6H PRN        EXAMINATION    VITALS   Vitals:    06/10/25 0153 06/10/25 0728 06/10/25 1914 06/11/25 0733   BP: (!) 150/100 (!) 143/79 131/78 117/74   BP Location: Right arm Left arm Left arm Left arm   Patient Position: Sitting Sitting Sitting Sitting   Pulse: (!) 113 97 87 87   Resp: 20 18 16 20   Temp: 98.3 °F (36.8 °C) 97.8 °F (36.6 °C) 98.2 °F (36.8 °C) 98.1 °F (36.7 °C)   TempSrc: Oral Oral Oral Oral   SpO2: 99% 99% 100% 100%   Weight: 60.6 kg (133 lb 9.6 oz)      Height: 5' 3" (1.6 m)          Body mass index is 23.67 kg/m².        CONSTITUTIONAL  General Appearance: unremarkable, age appropriate    MUSCULOSKELETAL  Muscle Strength and Tone:no tremor, no tic  Abnormal Involuntary Movements: No  Gait and Station: non-ataxic    PSYCHIATRIC   Level of Consciousness: awake, alert , and oriented  Orientation: person, place, time, and situation  Grooming: Casually dressed and Well groomed  Psychomotor Behavior: normal, cooperative  Speech: normal tone, normal rate, normal pitch, normal volume  Language: grossly intact  Mood: anxious and depressed  Affect: Consistent with mood  Thought Process: linear, " logical  Associations: intact   Thought Content: denies SI and denies HI  Perceptions: denies AH, denies  VH, and not RIS  Memory: Able to recall past events, Remote intact, and Recent intact  Attention:Attends to interview without distraction  Fund of Knowledge: Aware of current events and Vocabulary appropriate   Estimate if Intelligence:  Average based on work/education history, vocabulary and mental status exam  Insight: has awareness of illness  Judgment: behavior is adequate to circumstances        DIAGNOSTIC TESTING   Laboratory Results  Recent Results (from the past 24 hours)   POCT glucose    Collection Time: 06/10/25 11:31 AM   Result Value Ref Range    POCT Glucose 326 (H) 70 - 110 mg/dL   POCT glucose    Collection Time: 06/10/25  5:13 PM   Result Value Ref Range    POCT Glucose 296 (H) 70 - 110 mg/dL   POCT glucose    Collection Time: 06/10/25  8:37 PM   Result Value Ref Range    POCT Glucose 267 (H) 70 - 110 mg/dL   POCT glucose    Collection Time: 06/11/25  5:31 AM   Result Value Ref Range    POCT Glucose 200 (H) 70 - 110 mg/dL   Hemoglobin A1c    Collection Time: 06/11/25  6:33 AM   Result Value Ref Range    Hemoglobin A1c 10.0 (H) 4.0 - 5.6 %    Estimated Average Glucose 240 (H) 68 - 131 mg/dL   Lipid Panel    Collection Time: 06/11/25  6:33 AM   Result Value Ref Range    Cholesterol Total 211 (H) 120 - 199 mg/dL    Triglyceride 176 (H) 30 - 150 mg/dL    HDL Cholesterol 36 (L) 40 - 75 mg/dL    LDL Cholesterol 139.8 63.0 - 159.0 mg/dL    HDL/Cholesterol Ratio 17.1 (L) 20.0 - 50.0 %    Cholesterol/HDL Ratio 5.9 (H) 2.0 - 5.0    Non HDL Cholesterol 175 mg/dL             MEDICAL DECISION MAKING      ASSESSMENT      Bipolar Disorder NOS, mre depressed severe without psychotic features  Unspecified Specified Anxiety Disorder     Psychosocial stressors     Nicotine dependence  Cannabis abuse     NIDDM        PROBLEM LIST AND MANAGEMENT PLANS     Bipolar Depression: pt counseled  -hold home Trileptal at 150  mg po BID  -start trial of Latuda 20 mg po q day with dinner -Increase to 40 mg tomorrow     Anxiety Disorder: pt counseled  -start trial of Buspar 5 mg po BID  -Initiate gabapentin 100 mg TID off label for anxiety/slee, occasional peripheral neuropathy  -Taper inderal to 10 mg BID with plan to DC (ineffective per patient)     Psychosocial stressors: pt counseled  -SW consulted to assist with resources     Nicotine dependence: pt counseled  -start nicotine 14 mg patch dermal     Cannabis abuse: pt counseled     NIDDM: pt counseled  -med consulted  -check HgA1c          Discussed diagnosis, risks and benefits of proposed treatment vs alternative treatments vs no treatment, potential side effects of these treatments and the inherent unpredictability of treatment. The patient expresses understanding of the above and displays the capacity to agree with this treatment given said understanding. Patient also agrees that, currently, the benefits outweigh the risks and would like to pursue/continue treatment at this time.    Any medications being used off-label were discussed with the patient inclusive of the evidence base for the use of the medications and consent was obtained for the off-label use of the medication.       DISCHARGE PLANNING  Expected Disposition Plan: Home or Self Care      NEED FOR CONTINUED HOSPITALIZATION  Psychiatric illness continues to pose a potential threat to life or bodily function, of self or others, thereby requiring the need for continued inpatient psychiatric hospitalization: Yes, due to: danger to self, as evidenced by:  Ongoing concerns with SI.    Protective inpatient pyschiatric hospitalization required while a safe disposition plan is enacted: Yes    Patient stabilized and ready for discharge from inpatient psychiatric unit: No        STAFF:   Arash Christy NP  Psychiatry
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
## 2025-06-12 LAB
POCT GLUCOSE: 214 MG/DL (ref 70–110)
POCT GLUCOSE: 244 MG/DL (ref 70–110)
POCT GLUCOSE: 245 MG/DL (ref 70–110)

## 2025-06-12 PROCEDURE — 90833 PSYTX W PT W E/M 30 MIN: CPT | Mod: HB,SA,, | Performed by: PSYCHIATRY & NEUROLOGY

## 2025-06-12 PROCEDURE — 25000003 PHARM REV CODE 250: Performed by: NURSE PRACTITIONER

## 2025-06-12 PROCEDURE — 99232 SBSQ HOSP IP/OBS MODERATE 35: CPT | Mod: HB,SA,, | Performed by: PSYCHIATRY & NEUROLOGY

## 2025-06-12 PROCEDURE — 25000003 PHARM REV CODE 250: Performed by: PSYCHIATRY & NEUROLOGY

## 2025-06-12 PROCEDURE — 11400000 HC PSYCH PRIVATE ROOM

## 2025-06-12 PROCEDURE — S4991 NICOTINE PATCH NONLEGEND: HCPCS | Performed by: PSYCHIATRY & NEUROLOGY

## 2025-06-12 RX ORDER — TRAZODONE HYDROCHLORIDE 100 MG/1
100 TABLET ORAL NIGHTLY
Status: DISCONTINUED | OUTPATIENT
Start: 2025-06-12 | End: 2025-06-18 | Stop reason: HOSPADM

## 2025-06-12 RX ORDER — GABAPENTIN 300 MG/1
300 CAPSULE ORAL 3 TIMES DAILY
Status: DISCONTINUED | OUTPATIENT
Start: 2025-06-12 | End: 2025-06-14

## 2025-06-12 RX ADMIN — INSULIN ASPART 2 UNITS: 100 INJECTION, SOLUTION INTRAVENOUS; SUBCUTANEOUS at 11:06

## 2025-06-12 RX ADMIN — LURASIDONE HYDROCHLORIDE 40 MG: 40 TABLET, FILM COATED ORAL at 04:06

## 2025-06-12 RX ADMIN — GABAPENTIN 300 MG: 300 CAPSULE ORAL at 08:06

## 2025-06-12 RX ADMIN — NITROFURANTOIN MONOHYDRATE/MACROCRYSTALS 100 MG: 25; 75 CAPSULE ORAL at 09:06

## 2025-06-12 RX ADMIN — PROPRANOLOL HYDROCHLORIDE 10 MG: 10 TABLET ORAL at 08:06

## 2025-06-12 RX ADMIN — NITROFURANTOIN MONOHYDRATE/MACROCRYSTALS 100 MG: 25; 75 CAPSULE ORAL at 08:06

## 2025-06-12 RX ADMIN — IBUPROFEN 400 MG: 400 TABLET, FILM COATED ORAL at 09:06

## 2025-06-12 RX ADMIN — OLANZAPINE 10 MG: 10 TABLET, FILM COATED ORAL at 08:06

## 2025-06-12 RX ADMIN — GABAPENTIN 100 MG: 100 CAPSULE ORAL at 09:06

## 2025-06-12 RX ADMIN — BUSPIRONE HYDROCHLORIDE 5 MG: 5 TABLET ORAL at 09:06

## 2025-06-12 RX ADMIN — INSULIN ASPART 1 UNITS: 100 INJECTION, SOLUTION INTRAVENOUS; SUBCUTANEOUS at 09:06

## 2025-06-12 RX ADMIN — TRAZODONE HYDROCHLORIDE 100 MG: 100 TABLET ORAL at 08:06

## 2025-06-12 RX ADMIN — HYDROXYZINE PAMOATE 50 MG: 50 CAPSULE ORAL at 08:06

## 2025-06-12 RX ADMIN — GABAPENTIN 300 MG: 300 CAPSULE ORAL at 04:06

## 2025-06-12 RX ADMIN — NICOTINE 1 PATCH: 14 PATCH, EXTENDED RELEASE TRANSDERMAL at 09:06

## 2025-06-12 RX ADMIN — BUSPIRONE HYDROCHLORIDE 5 MG: 5 TABLET ORAL at 08:06

## 2025-06-12 RX ADMIN — PROPRANOLOL HYDROCHLORIDE 10 MG: 10 TABLET ORAL at 09:06

## 2025-06-12 RX ADMIN — INSULIN ASPART 2 UNITS: 100 INJECTION, SOLUTION INTRAVENOUS; SUBCUTANEOUS at 05:06

## 2025-06-12 NOTE — PLAN OF CARE
Problem: Adult Behavioral Health Plan of Care  Goal: Plan of Care Review  Outcome: Progressing  Goal: Patient-Specific Goal (Individualization)  Outcome: Progressing  Goal: Adheres to Safety Considerations for Self and Others  Outcome: Progressing  Goal: Absence of New-Onset Illness or Injury  Outcome: Progressing  Goal: Optimized Coping Skills in Response to Life Stressors  Outcome: Progressing     Problem: Anxiety Signs/Symptoms  Goal: Optimized Energy Level (Anxiety Signs/Symptoms)  Outcome: Progressing  Goal: Optimized Cognitive Function (Anxiety Signs/Symptoms)  Outcome: Progressing  Goal: Improved Mood Symptoms (Anxiety Signs/Symptoms)  Outcome: Progressing  Goal: Improved Sleep (Anxiety Signs/Symptoms)  Outcome: Progressing  Goal: Enhanced Social, Occupational or Functional Skills (Anxiety Signs/Symptoms)  Outcome: Progressing  Goal: Improved Somatic Symptoms (Anxiety Signs/Symptoms)  Outcome: Progressing     Problem: Coping Ineffective  Goal: Effective Coping  Outcome: Progressing

## 2025-06-12 NOTE — PROGRESS NOTES
"PSYCHIATRY DAILY INPATIENT PROGRESS NOTE  SUBSEQUENT HOSPITAL VISIT    ENCOUNTER DATE: 6/12/2025  SITE: [facility]    DATE OF ADMISSION: 6/10/2025  1:45 AM  LENGTH OF STAY: 2 days      CHIEF COMPLAINT   Sandrine Roland is a 50 y.o. female, seen during daily smith rounds on the inpatient unit.  Sandrine Roland presented with the chief complaint of anxiety, "I was having anxiety attacks and couldn't breathe."      The patient was seen and examined. The chart was reviewed.     Reviewed notes from Rns and MD and labs from the last 24 hours.    The patient's case was discussed with the treatment team/care providers today including Rns and MD    Staff reports no behavioral or management issues.     The patient has been compliant with treatment.      Subjective 06/12/2025       Patient continues to endorse anxiety and depression.  Reports racing thoughts last night making it difficult to fall and stay asleep.  She is unable to pinpoint any particular precipitant at this time, states she is feeling anxious in general.   Endorses  continuing hopelessness, denies suicidal ideation at this time.  Patient states that she fell gabapentin was a little bit helpful during the day yesterday in terms of providing anxiety relief and she is amenable to titration.  Tolerating propanolol taper well, vital signs are within normal limits.    Symptoms persist and remain impairing requiring ongoing inpatient psychiatric hospitalization      The patient denies any side effects to medications.        Interim/overnight events per report/notes:          Psychiatric ROS (observed, reported, or endorsed/denied):  Depressed mood - Yes  Interest/pleasure/anhedonia: Yes  Guilt/hopelessness/worthlessness - Yes  Changes in Sleep - Yes  Changes in Appetite - No  Changes in Concentration - No  Changes in Energy - Yes  PMA/R- No  Suicidal- active/passive ideations - No  Homicidal ideations: active/passive ideations - " No    Hallucinations - No  Delusions - No  Disorganized behavior - No  Disorganized speech - No  Negative symptoms - No    Elevated mood - No  Decreased need for sleep - No  Grandiosity - No  Racing thoughts - No  Impulsivity - No  Irritability- No  Increased energy - No  Distractibility - No  Increase in goal-directed activity or PMA- No    Symptoms of ZEE - Yes  Symptoms of Panic Disorder- No  Symptoms of PTSD - No        Overall progress: Patient is showing no improvement on the Unit to date        Psychotherapy:  Target symptoms: anxiety   Why chosen therapy is appropriate versus another modality: relevant to diagnosis, evidence based practice  Outcome monitoring methods: self-report, observation  Therapeutic intervention type: insight oriented psychotherapy, supportive psychotherapy  Topics discussed/themes: building skills sets for symptom management, symptom recognition  The patient's response to the intervention is accepting. The patient's progress toward treatment goals is good.   Duration of intervention: 17 minutes.        Medical ROS  Review of Systems   Constitutional: Negative.    HENT: Negative.     Eyes: Negative.    Respiratory: Negative.     Cardiovascular: Negative.    Gastrointestinal: Negative.    Genitourinary: Negative.    Musculoskeletal: Negative.    Skin: Negative.    Neurological: Negative.    Endo/Heme/Allergies: Negative.    Psychiatric/Behavioral:  The patient is nervous/anxious.          PAST MEDICAL HISTORY   Past Medical History:   Diagnosis Date    Bipolar 1 disorder     Diabetes mellitus            PSYCHOTROPIC MEDICATIONS   Scheduled Meds:   busPIRone  5 mg Oral BID    gabapentin  100 mg Oral TID    lurasidone  40 mg Oral with dinner    mupirocin   Nasal BID    nitrofurantoin (macrocrystal-monohydrate)  100 mg Oral Q12H    propranoloL  10 mg Oral BID    traZODone  50 mg Oral QHS     Continuous Infusions:  PRN Meds:.  Current Facility-Administered Medications:      aluminum-magnesium hydroxide-simethicone, 30 mL, Oral, Q6H PRN    benzonatate, 100 mg, Oral, TID PRN    benztropine mesylate, 2 mg, Intramuscular, Q8H PRN    dextrose 50%, 12.5 g, Intravenous, PRN    dextrose 50%, 25 g, Intravenous, PRN    glucagon (human recombinant), 1 mg, Intramuscular, PRN    glucose, 16 g, Oral, PRN    glucose, 24 g, Oral, PRN    hydrOXYzine pamoate, 50 mg, Oral, Q6H PRN    ibuprofen, 400 mg, Oral, Q6H PRN    insulin aspart U-100, 0-5 Units, Subcutaneous, QID (AC + HS) PRN    loperamide, 2 mg, Oral, PRN    magnesium hydroxide 400 mg/5 ml, 30 mL, Oral, Daily PRN    nicotine, 1 patch, Transdermal, Daily PRN    OLANZapine, 10 mg, Oral, Q8H PRN **AND** OLANZapine, 10 mg, Intramuscular, Q8H PRN    ondansetron, 4 mg, Oral, Q8H PRN    promethazine, 25 mg, Oral, Q6H PRN        EXAMINATION    VITALS   Vitals:    06/10/25 1914 06/11/25 0733 06/11/25 1919 06/12/25 0725   BP: 131/78 117/74 (!) 120/92 123/69   BP Location: Left arm Left arm Left arm Left arm   Patient Position: Sitting Sitting Sitting Sitting   Pulse: 87 87 98 102   Resp: 16 20 18 20   Temp: 98.2 °F (36.8 °C) 98.1 °F (36.7 °C) 98.2 °F (36.8 °C) 97.8 °F (36.6 °C)   TempSrc: Oral Oral Oral Oral   SpO2: 100% 100% 99% 99%   Weight:       Height:           Body mass index is 23.67 kg/m².        CONSTITUTIONAL  General Appearance: unremarkable, age appropriate    MUSCULOSKELETAL  Muscle Strength and Tone:no tremor, no tic  Abnormal Involuntary Movements: No  Gait and Station: non-ataxic    PSYCHIATRIC   Level of Consciousness: awake, alert , and oriented  Orientation: person, place, time, and situation  Grooming: Casually dressed and Well groomed  Psychomotor Behavior: normal, cooperative  Speech: normal tone, normal rate, normal pitch, normal volume  Language: grossly intact  Mood: anxious and depressed  Affect: Consistent with mood  Thought Process: linear, logical  Associations: intact   Thought Content: denies SI and denies HI  Perceptions:  denies AH, denies  VH, and not RIS  Memory: Able to recall past events, Remote intact, and Recent intact  Attention:Attends to interview without distraction  Fund of Knowledge: Aware of current events and Vocabulary appropriate   Estimate if Intelligence:  Average based on work/education history, vocabulary and mental status exam  Insight: has awareness of illness  Judgment: behavior is adequate to circumstances        DIAGNOSTIC TESTING   Laboratory Results  Recent Results (from the past 24 hours)   POCT glucose    Collection Time: 06/11/25 11:57 AM   Result Value Ref Range    POCT Glucose 278 (H) 70 - 110 mg/dL   POCT glucose    Collection Time: 06/11/25  4:36 PM   Result Value Ref Range    POCT Glucose 281 (H) 70 - 110 mg/dL   POCT glucose    Collection Time: 06/11/25 10:12 PM   Result Value Ref Range    POCT Glucose 284 (H) 70 - 110 mg/dL   POCT glucose    Collection Time: 06/12/25  5:50 AM   Result Value Ref Range    POCT Glucose 214 (H) 70 - 110 mg/dL   POCT glucose    Collection Time: 06/12/25 11:32 AM   Result Value Ref Range    POCT Glucose 244 (H) 70 - 110 mg/dL             MEDICAL DECISION MAKING      ASSESSMENT      Bipolar Disorder NOS, mre depressed severe without psychotic features  Unspecified Specified Anxiety Disorder     Psychosocial stressors     Nicotine dependence  Cannabis abuse     NIDDM        PROBLEM LIST AND MANAGEMENT PLANS     Bipolar Depression: pt counseled  -hold home Trileptal at 150 mg po BID  -start trial of Latuda 20 mg po q day with dinner -Increase to 40 mg tomorrow     Anxiety Disorder: pt counseled  -start trial of Buspar 5 mg po BID  -Initiate gabapentin 100 mg TID off label for anxiety/slee, occasional peripheral neuropathy-Increase to 300 mg TID  -Taper inderal to 10 mg BID with plan to DC (ineffective per patient)  -Initiate trazodone 50 mg qhs-Increase to 100 mg qhs     Psychosocial stressors: pt counseled  -SW consulted to assist with resources     Nicotine dependence:  pt counseled  -start nicotine 14 mg patch dermal     Cannabis abuse: pt counseled     NIDDM: pt counseled  -med consulted  -check HgA1c          Discussed diagnosis, risks and benefits of proposed treatment vs alternative treatments vs no treatment, potential side effects of these treatments and the inherent unpredictability of treatment. The patient expresses understanding of the above and displays the capacity to agree with this treatment given said understanding. Patient also agrees that, currently, the benefits outweigh the risks and would like to pursue/continue treatment at this time.    Any medications being used off-label were discussed with the patient inclusive of the evidence base for the use of the medications and consent was obtained for the off-label use of the medication.       DISCHARGE PLANNING  Expected Disposition Plan: Home or Self Care      NEED FOR CONTINUED HOSPITALIZATION  Psychiatric illness continues to pose a potential threat to life or bodily function, of self or others, thereby requiring the need for continued inpatient psychiatric hospitalization: Yes, due to: danger to self, as evidenced by:  Ongoing concerns with SI.    Protective inpatient pyschiatric hospitalization required while a safe disposition plan is enacted: Yes    Patient stabilized and ready for discharge from inpatient psychiatric unit: No        STAFF:   Arash Christy NP  Psychiatry

## 2025-06-12 NOTE — PLAN OF CARE
POC reviewed this shift and is on going. Patient is withdrawn, depressed, and anxious.  Denies Suicidal Ideation, Homicidal Ideation, Auditory Hallucinations, Visual Hallucinations, Tactile Hallucinations, Gustatory Hallucinations, and Delusions.Interacts well with select peers. Self isolates at times. Pt continues to be medication compliant and staff will continue to monitor pt closely while on the unit.

## 2025-06-12 NOTE — NURSING
Patient pleasant and cooperative with no negative behaviors noted. Patient endorses depression 5/10, endorses anxiety 10/10, denies S/HI, denies A/VH. Patient compliant with medication without side effects noted. Patient continued on antibiotic for UTI without complaints of symptoms. Fluids encouraged. PRN Hydroxyzine 50 mg administered for anxiety as ordered (see emar) and noted effective. Patient accepted HS snack. HS accucheck 284. PRN Sliding scale insulin administered as ordered. Patient resting well Close observations continued and safe environment maintained.

## 2025-06-13 LAB
POCT GLUCOSE: 107 MG/DL (ref 70–110)
POCT GLUCOSE: 227 MG/DL (ref 70–110)
POCT GLUCOSE: 233 MG/DL (ref 70–110)
POCT GLUCOSE: 271 MG/DL (ref 70–110)
POCT GLUCOSE: 458 MG/DL (ref 70–110)

## 2025-06-13 PROCEDURE — 25000003 PHARM REV CODE 250: Performed by: PSYCHIATRY & NEUROLOGY

## 2025-06-13 PROCEDURE — 11400000 HC PSYCH PRIVATE ROOM

## 2025-06-13 PROCEDURE — 25000003 PHARM REV CODE 250: Performed by: NURSE PRACTITIONER

## 2025-06-13 PROCEDURE — 63600175 PHARM REV CODE 636 W HCPCS: Performed by: INTERNAL MEDICINE

## 2025-06-13 PROCEDURE — 90833 PSYTX W PT W E/M 30 MIN: CPT | Mod: SA,HB,, | Performed by: PSYCHIATRY & NEUROLOGY

## 2025-06-13 PROCEDURE — S4991 NICOTINE PATCH NONLEGEND: HCPCS | Performed by: PSYCHIATRY & NEUROLOGY

## 2025-06-13 PROCEDURE — 99232 SBSQ HOSP IP/OBS MODERATE 35: CPT | Mod: SA,HB,, | Performed by: PSYCHIATRY & NEUROLOGY

## 2025-06-13 RX ORDER — INSULIN GLARGINE 100 [IU]/ML
20 INJECTION, SOLUTION SUBCUTANEOUS NIGHTLY
Status: DISCONTINUED | OUTPATIENT
Start: 2025-06-13 | End: 2025-06-18 | Stop reason: HOSPADM

## 2025-06-13 RX ORDER — DOCUSATE SODIUM 100 MG/1
100 CAPSULE, LIQUID FILLED ORAL DAILY
Status: DISCONTINUED | OUTPATIENT
Start: 2025-06-13 | End: 2025-06-18 | Stop reason: HOSPADM

## 2025-06-13 RX ORDER — INSULIN ASPART 100 [IU]/ML
15 INJECTION, SOLUTION INTRAVENOUS; SUBCUTANEOUS ONCE
Status: COMPLETED | OUTPATIENT
Start: 2025-06-13 | End: 2025-06-13

## 2025-06-13 RX ORDER — BUSPIRONE HYDROCHLORIDE 5 MG/1
10 TABLET ORAL 2 TIMES DAILY
Status: DISCONTINUED | OUTPATIENT
Start: 2025-06-13 | End: 2025-06-14

## 2025-06-13 RX ADMIN — GABAPENTIN 300 MG: 300 CAPSULE ORAL at 03:06

## 2025-06-13 RX ADMIN — PROPRANOLOL HYDROCHLORIDE 10 MG: 10 TABLET ORAL at 08:06

## 2025-06-13 RX ADMIN — INSULIN ASPART 3 UNITS: 100 INJECTION, SOLUTION INTRAVENOUS; SUBCUTANEOUS at 10:06

## 2025-06-13 RX ADMIN — BUSPIRONE HYDROCHLORIDE 10 MG: 5 TABLET ORAL at 08:06

## 2025-06-13 RX ADMIN — GABAPENTIN 300 MG: 300 CAPSULE ORAL at 08:06

## 2025-06-13 RX ADMIN — NITROFURANTOIN MONOHYDRATE/MACROCRYSTALS 100 MG: 25; 75 CAPSULE ORAL at 08:06

## 2025-06-13 RX ADMIN — HYDROXYZINE PAMOATE 50 MG: 50 CAPSULE ORAL at 08:06

## 2025-06-13 RX ADMIN — INSULIN ASPART 15 UNITS: 100 INJECTION, SOLUTION INTRAVENOUS; SUBCUTANEOUS at 05:06

## 2025-06-13 RX ADMIN — NICOTINE 1 PATCH: 14 PATCH, EXTENDED RELEASE TRANSDERMAL at 08:06

## 2025-06-13 RX ADMIN — INSULIN ASPART 2 UNITS: 100 INJECTION, SOLUTION INTRAVENOUS; SUBCUTANEOUS at 06:06

## 2025-06-13 RX ADMIN — INSULIN ASPART 5 UNITS: 100 INJECTION, SOLUTION INTRAVENOUS; SUBCUTANEOUS at 04:06

## 2025-06-13 RX ADMIN — LURASIDONE HYDROCHLORIDE 40 MG: 40 TABLET, FILM COATED ORAL at 04:06

## 2025-06-13 RX ADMIN — DOCUSATE SODIUM 100 MG: 100 CAPSULE, LIQUID FILLED ORAL at 10:06

## 2025-06-13 RX ADMIN — BUSPIRONE HYDROCHLORIDE 5 MG: 5 TABLET ORAL at 08:06

## 2025-06-13 RX ADMIN — TRAZODONE HYDROCHLORIDE 100 MG: 100 TABLET ORAL at 08:06

## 2025-06-13 NOTE — PROGRESS NOTES
"PSYCHIATRY DAILY INPATIENT PROGRESS NOTE  SUBSEQUENT HOSPITAL VISIT    ENCOUNTER DATE: 6/13/2025  SITE: [facility]    DATE OF ADMISSION: 6/10/2025  1:45 AM  LENGTH OF STAY: 3 days      CHIEF COMPLAINT   Sandrine Roladn is a 50 y.o. female, seen during daily smith rounds on the inpatient unit.  Sandrine Roland presented with the chief complaint of anxiety, "I was having anxiety attacks and couldn't breathe."      The patient was seen and examined. The chart was reviewed.     Reviewed notes from Rns and MD and labs from the last 24 hours.    The patient's case was discussed with the treatment team/care providers today including Rns and MD    Staff reports no behavioral or management issues.     The patient has been compliant with treatment.      Subjective 06/13/2025       Patient reports feeling less depressed today, continues to endorse anxiety with racing thoughts.  Of note, patient continues to demonstrate some increased latency of response to assessment questions and possible thought blocking.  Patient was asked, aside from medications, what changes she feels she may an act upon discharge from the hospital in order to prevent future hospitalizations.  The patient responded I just need to work on myself, however she is unable to provide any additional details or context.  Patient reports that her sister's plan to come visit her at the hospital this evening which she is feeling a little anxious about.     Patient continues to deny alcohol cravings.    Symptoms persist and remain impairing      The patient denies any side effects to medications.        Interim/overnight events per report/notes:          Psychiatric ROS (observed, reported, or endorsed/denied):  Depressed mood - Yes  Interest/pleasure/anhedonia: Yes  Guilt/hopelessness/worthlessness - Yes  Changes in Sleep - Yes  Changes in Appetite - No  Changes in Concentration - No  Changes in Energy - Yes  PMA/R- No  Suicidal- " active/passive ideations - No  Homicidal ideations: active/passive ideations - No    Hallucinations - No  Delusions - No  Disorganized behavior - No  Disorganized speech - No  Negative symptoms - No    Elevated mood - No  Decreased need for sleep - No  Grandiosity - No  Racing thoughts - No  Impulsivity - No  Irritability- No  Increased energy - No  Distractibility - No  Increase in goal-directed activity or PMA- No    Symptoms of ZEE - Yes  Symptoms of Panic Disorder- No  Symptoms of PTSD - No        Overall progress: Patient is showing no improvement on the Unit to date        Psychotherapy:  Target symptoms: anxiety   Why chosen therapy is appropriate versus another modality: relevant to diagnosis, evidence based practice  Outcome monitoring methods: self-report, observation  Therapeutic intervention type: insight oriented psychotherapy, supportive psychotherapy  Topics discussed/themes: building skills sets for symptom management, symptom recognition  The patient's response to the intervention is accepting. The patient's progress toward treatment goals is good.   Duration of intervention: 17 minutes.        Medical ROS  Review of Systems   Constitutional: Negative.    HENT: Negative.     Eyes: Negative.    Respiratory: Negative.     Cardiovascular: Negative.    Gastrointestinal: Negative.    Genitourinary: Negative.    Musculoskeletal: Negative.    Skin: Negative.    Neurological: Negative.    Endo/Heme/Allergies: Negative.    Psychiatric/Behavioral:  The patient is nervous/anxious.          PAST MEDICAL HISTORY   Past Medical History:   Diagnosis Date    Bipolar 1 disorder     Diabetes mellitus            PSYCHOTROPIC MEDICATIONS   Scheduled Meds:   busPIRone  10 mg Oral BID    docusate sodium  100 mg Oral Daily    gabapentin  300 mg Oral TID    lurasidone  40 mg Oral with dinner    mupirocin   Nasal BID    nitrofurantoin (macrocrystal-monohydrate)  100 mg Oral Q12H    propranoloL  10 mg Oral BID    traZODone   100 mg Oral QHS     Continuous Infusions:  PRN Meds:.  Current Facility-Administered Medications:     aluminum-magnesium hydroxide-simethicone, 30 mL, Oral, Q6H PRN    benzonatate, 100 mg, Oral, TID PRN    benztropine mesylate, 2 mg, Intramuscular, Q8H PRN    dextrose 50%, 12.5 g, Intravenous, PRN    dextrose 50%, 25 g, Intravenous, PRN    glucagon (human recombinant), 1 mg, Intramuscular, PRN    glucose, 16 g, Oral, PRN    glucose, 24 g, Oral, PRN    hydrOXYzine pamoate, 50 mg, Oral, Q6H PRN    ibuprofen, 400 mg, Oral, Q6H PRN    insulin aspart U-100, 0-5 Units, Subcutaneous, QID (AC + HS) PRN    loperamide, 2 mg, Oral, PRN    magnesium hydroxide 400 mg/5 ml, 30 mL, Oral, Daily PRN    nicotine, 1 patch, Transdermal, Daily PRN    OLANZapine, 10 mg, Oral, Q8H PRN **AND** OLANZapine, 10 mg, Intramuscular, Q8H PRN    ondansetron, 4 mg, Oral, Q8H PRN    promethazine, 25 mg, Oral, Q6H PRN        EXAMINATION    VITALS   Vitals:    06/11/25 1919 06/12/25 0725 06/12/25 1915 06/13/25 0800   BP: (!) 120/92 123/69 (!) 132/59 130/86   BP Location: Left arm Left arm Left arm Left arm   Patient Position: Sitting Sitting Sitting Sitting   Pulse: 98 102 106 82   Resp: 18 20 20 18   Temp: 98.2 °F (36.8 °C) 97.8 °F (36.6 °C) 98.5 °F (36.9 °C) 97.9 °F (36.6 °C)   TempSrc: Oral Oral Oral Oral   SpO2: 99% 99% 100% 99%   Weight:       Height:           Body mass index is 23.67 kg/m².        CONSTITUTIONAL  General Appearance: unremarkable, age appropriate    MUSCULOSKELETAL  Muscle Strength and Tone:no tremor, no tic  Abnormal Involuntary Movements: No  Gait and Station: non-ataxic    PSYCHIATRIC   Level of Consciousness: awake, alert , and oriented  Orientation: person, place, time, and situation  Grooming: Casually dressed and Well groomed  Psychomotor Behavior: normal, cooperative  Speech: normal tone, normal rate, normal pitch, normal volume  Language: grossly intact  Mood: anxious and depressed  Affect: Consistent with mood  Thought  Process: linear, logical  Associations: intact   Thought Content: denies SI and denies HI  Perceptions: denies AH, denies  VH, and not RIS  Memory: Able to recall past events, Remote intact, and Recent intact  Attention:Attends to interview without distraction  Fund of Knowledge: Aware of current events and Vocabulary appropriate   Estimate if Intelligence:  Average based on work/education history, vocabulary and mental status exam  Insight: has awareness of illness  Judgment: behavior is adequate to circumstances        DIAGNOSTIC TESTING   Laboratory Results  Recent Results (from the past 24 hours)   POCT glucose    Collection Time: 06/12/25 11:32 AM   Result Value Ref Range    POCT Glucose 244 (H) 70 - 110 mg/dL   POCT glucose    Collection Time: 06/12/25  5:47 PM   Result Value Ref Range    POCT Glucose 245 (H) 70 - 110 mg/dL   POCT glucose    Collection Time: 06/12/25  8:41 PM   Result Value Ref Range    POCT Glucose 227 (H) 70 - 110 mg/dL   POCT glucose    Collection Time: 06/13/25  6:15 AM   Result Value Ref Range    POCT Glucose 233 (H) 70 - 110 mg/dL   POCT glucose    Collection Time: 06/13/25 10:40 AM   Result Value Ref Range    POCT Glucose 271 (H) 70 - 110 mg/dL             MEDICAL DECISION MAKING      ASSESSMENT      Bipolar Disorder NOS, mre depressed severe without psychotic features  Unspecified Specified Anxiety Disorder     Psychosocial stressors     Nicotine dependence  Cannabis abuse     NIDDM        PROBLEM LIST AND MANAGEMENT PLANS     Bipolar Depression: pt counseled  -hold home Trileptal at 150 mg po BID  -start trial of Latuda 20 mg po q day with dinner -Increase to 40 mg tomorrow-Continue     Anxiety Disorder: pt counseled  -start trial of Buspar 5 mg po BID-Increase to 10 mg BID  -Initiate gabapentin 100 mg TID off label for anxiety/slee, occasional peripheral neuropathy-Increase to 300 mg TID  -Taper inderal to 10 mg BID with plan to DC (ineffective per patient)-taper to 10 mg  daily  -Initiate trazodone 50 mg qhs-Increase to 100 mg qhs     Psychosocial stressors: pt counseled  -SW consulted to assist with resources     Nicotine dependence: pt counseled  -start nicotine 14 mg patch dermal     Cannabis abuse: pt counseled     NIDDM: pt counseled  -med consulted  -check HgA1c          Discussed diagnosis, risks and benefits of proposed treatment vs alternative treatments vs no treatment, potential side effects of these treatments and the inherent unpredictability of treatment. The patient expresses understanding of the above and displays the capacity to agree with this treatment given said understanding. Patient also agrees that, currently, the benefits outweigh the risks and would like to pursue/continue treatment at this time.    Any medications being used off-label were discussed with the patient inclusive of the evidence base for the use of the medications and consent was obtained for the off-label use of the medication.       DISCHARGE PLANNING  Expected Disposition Plan: Home or Self Care      NEED FOR CONTINUED HOSPITALIZATION  Psychiatric illness continues to pose a potential threat to life or bodily function, of self or others, thereby requiring the need for continued inpatient psychiatric hospitalization: Yes, due to: danger to self, as evidenced by:  Ongoing concerns with SI.    Protective inpatient pyschiatric hospitalization required while a safe disposition plan is enacted: Yes    Patient stabilized and ready for discharge from inpatient psychiatric unit: No        STAFF:   Arash Christy NP  Psychiatry

## 2025-06-13 NOTE — NURSING
Patient c/o anxiety related to an incident that happened on yesterday with another patient on the unit. Received medication as ordered. Patient in room resting at this time, no distress noted.

## 2025-06-13 NOTE — NURSING
Patient out in common area this evening interacting with peers and watching a movie. Patient approached nurse station requesting medication for anxiety. Patient noted slow deep breathing without distress noted (VS WNL), fidgety, and restless appearing agitated without verbal or physical aggression noted. PRN Hydroxyzine administered as ordered for anxiety (see emar) and noted effective. PRN Zyprexa administered as ordered for agitation (see emar) and noted effective. Patient remained in common area and accepted HS snack watching movie with peers. Blood sugar per accucheck 227 PRN Sliding scale administered as ordered. Patient went to her room at bedtime voicing no complaints. Close observations continued and safe environment maintained.

## 2025-06-13 NOTE — PLAN OF CARE
Problem: Adult Behavioral Health Plan of Care  Goal: Optimized Coping Skills in Response to Life Stressors  Intervention: Promote Effective Coping Strategies  Flowsheets (Taken 6/13/2025 4300)  Supportive Measures:   active listening utilized   counseling provided   verbalization of feelings encouraged   self-responsibility promoted   self-reflection promoted   self-care encouraged   relaxation techniques promoted       Behavior:  Patient arrived on time and was actively engaged in group discussion. She shared at most opportunities, asked relevant questions, and engaged appropriately with peers. She presented as alert and oriented and more comfortable in the group setting than previous sessions.    Intervention:  Counselor provided psycho-education on distress tolerance skills. Counselor introduced the concept of radical acceptance, emphasizing acknowledging reality without judgment. The group discussed and practiced self-soothing techniques using the five senses, such as listening to calming music and using scented lotions. Additionally, the ACCEPTS acronym was introduced as a set of distraction techniques: Activities, Contributing, Comparisons, Emotions, Pushing away, Thoughts, and Sensations.    Response:  Patient responded well to the intervention. She demonstrated improved knowledge of the concepts discussed. She provided thoughtful responses to the discussion and actively listened to the contributions of her peers. Patient shared that the idea of radical acceptance seemed difficult to apply, but the concept made sense. Patient also identified specific ACCEPTS strategies she plans to implement in day to day life. She expressed gratitude for the topic and session.    Plan:  Patient will be encouraged to participate in MET/CBT- based groups 2-5 times per week and/or 1:1 as needed to address issues and coping strategies. Patient will participate in recreational groups 2-5 times per week and/or 1:1 as needed to  build peer support and relaxation skills.

## 2025-06-13 NOTE — NURSING
Patient able to make needs known. No complaints noted at this time.patient is still anxious. Patient stays in room. Comes out for meals and group. Compliant with staff and medications. Denies any SI/HI. Denies A/VH. Plan of care continues.

## 2025-06-14 LAB
POCT GLUCOSE: 224 MG/DL (ref 70–110)
POCT GLUCOSE: 239 MG/DL (ref 70–110)
POCT GLUCOSE: 250 MG/DL (ref 70–110)
POCT GLUCOSE: 265 MG/DL (ref 70–110)

## 2025-06-14 PROCEDURE — 25000003 PHARM REV CODE 250: Performed by: STUDENT IN AN ORGANIZED HEALTH CARE EDUCATION/TRAINING PROGRAM

## 2025-06-14 PROCEDURE — 99233 SBSQ HOSP IP/OBS HIGH 50: CPT | Mod: AF,HB,, | Performed by: STUDENT IN AN ORGANIZED HEALTH CARE EDUCATION/TRAINING PROGRAM

## 2025-06-14 PROCEDURE — 63600175 PHARM REV CODE 636 W HCPCS: Performed by: INTERNAL MEDICINE

## 2025-06-14 PROCEDURE — S4991 NICOTINE PATCH NONLEGEND: HCPCS | Performed by: PSYCHIATRY & NEUROLOGY

## 2025-06-14 PROCEDURE — 25000003 PHARM REV CODE 250: Performed by: NURSE PRACTITIONER

## 2025-06-14 PROCEDURE — 25000003 PHARM REV CODE 250: Performed by: PSYCHIATRY & NEUROLOGY

## 2025-06-14 PROCEDURE — 11400000 HC PSYCH PRIVATE ROOM

## 2025-06-14 RX ORDER — TALC
9 POWDER (GRAM) TOPICAL NIGHTLY
Status: DISCONTINUED | OUTPATIENT
Start: 2025-06-14 | End: 2025-06-18 | Stop reason: HOSPADM

## 2025-06-14 RX ORDER — GABAPENTIN 400 MG/1
400 CAPSULE ORAL 3 TIMES DAILY
Status: DISCONTINUED | OUTPATIENT
Start: 2025-06-14 | End: 2025-06-18 | Stop reason: HOSPADM

## 2025-06-14 RX ORDER — PROPRANOLOL HYDROCHLORIDE 10 MG/1
10 TABLET ORAL DAILY
Status: DISCONTINUED | OUTPATIENT
Start: 2025-06-15 | End: 2025-06-16

## 2025-06-14 RX ADMIN — GABAPENTIN 300 MG: 300 CAPSULE ORAL at 03:06

## 2025-06-14 RX ADMIN — TRAZODONE HYDROCHLORIDE 100 MG: 100 TABLET ORAL at 08:06

## 2025-06-14 RX ADMIN — LURASIDONE HYDROCHLORIDE 40 MG: 40 TABLET, FILM COATED ORAL at 03:06

## 2025-06-14 RX ADMIN — GABAPENTIN 300 MG: 300 CAPSULE ORAL at 08:06

## 2025-06-14 RX ADMIN — Medication 9 MG: at 08:06

## 2025-06-14 RX ADMIN — INSULIN ASPART 2 UNITS: 100 INJECTION, SOLUTION INTRAVENOUS; SUBCUTANEOUS at 03:06

## 2025-06-14 RX ADMIN — IBUPROFEN 400 MG: 400 TABLET, FILM COATED ORAL at 08:06

## 2025-06-14 RX ADMIN — BUSPIRONE HYDROCHLORIDE 10 MG: 5 TABLET ORAL at 08:06

## 2025-06-14 RX ADMIN — GABAPENTIN 400 MG: 400 CAPSULE ORAL at 08:06

## 2025-06-14 RX ADMIN — NITROFURANTOIN MONOHYDRATE/MACROCRYSTALS 100 MG: 25; 75 CAPSULE ORAL at 08:06

## 2025-06-14 RX ADMIN — DOCUSATE SODIUM 100 MG: 100 CAPSULE, LIQUID FILLED ORAL at 09:06

## 2025-06-14 RX ADMIN — PROPRANOLOL HYDROCHLORIDE 10 MG: 10 TABLET ORAL at 08:06

## 2025-06-14 RX ADMIN — INSULIN ASPART 2 UNITS: 100 INJECTION, SOLUTION INTRAVENOUS; SUBCUTANEOUS at 05:06

## 2025-06-14 RX ADMIN — INSULIN ASPART 1 UNITS: 100 INJECTION, SOLUTION INTRAVENOUS; SUBCUTANEOUS at 08:06

## 2025-06-14 RX ADMIN — INSULIN ASPART 3 UNITS: 100 INJECTION, SOLUTION INTRAVENOUS; SUBCUTANEOUS at 11:06

## 2025-06-14 RX ADMIN — INSULIN GLARGINE 20 UNITS: 100 INJECTION, SOLUTION SUBCUTANEOUS at 08:06

## 2025-06-14 RX ADMIN — NICOTINE 1 PATCH: 14 PATCH, EXTENDED RELEASE TRANSDERMAL at 08:06

## 2025-06-14 NOTE — PLAN OF CARE
Problem: Adult Behavioral Health Plan of Care  Goal: Plan of Care Review  Outcome: Progressing  Goal: Patient-Specific Goal (Individualization)  Outcome: Progressing  Goal: Adheres to Safety Considerations for Self and Others  Outcome: Progressing  Goal: Absence of New-Onset Illness or Injury  Outcome: Progressing  Goal: Optimized Coping Skills in Response to Life Stressors  Outcome: Progressing  Goal: Develops/Participates in Therapeutic [place] to Support Successful Transition  Outcome: Progressing  Goal: Rounds/Family Conference  Outcome: Progressing     Problem: Anxiety Signs/Symptoms  Goal: Optimized Energy Level (Anxiety Signs/Symptoms)  Outcome: Progressing  Goal: Optimized Cognitive Function (Anxiety Signs/Symptoms)  Outcome: Progressing  Goal: Improved Mood Symptoms (Anxiety Signs/Symptoms)  Outcome: Progressing  Goal: Improved Sleep (Anxiety Signs/Symptoms)  Outcome: Progressing  Goal: Enhanced Social, Occupational or Functional Skills (Anxiety Signs/Symptoms)  Outcome: Progressing  Goal: Improved Somatic Symptoms (Anxiety Signs/Symptoms)  Outcome: Progressing     Problem: Coping Ineffective  Goal: Effective Coping  Outcome: Progressing

## 2025-06-14 NOTE — PROGRESS NOTES
"PSYCHIATRY DAILY INPATIENT PROGRESS NOTE  SUBSEQUENT HOSPITAL VISIT    ENCOUNTER DATE: 6/14/2025  SITE: [facility]    DATE OF ADMISSION: 6/10/2025  1:45 AM  LENGTH OF STAY: 4 days      CHIEF COMPLAINT   Sandrine Roland is a 50 y.o. female, seen during daily smith rounds on the inpatient unit.  Sandrine Roland presented with the chief complaint of anxiety, "I was having anxiety attacks and couldn't breathe."      The patient was seen and examined. The chart was reviewed.     Reviewed notes from Rns and MD and labs from the last 24 hours.    The patient's case was discussed with the treatment team/care providers today including Rns and MD    Staff reports no behavioral or management issues.     The patient has been compliant with treatment.      Subjective 06/14/2025       Pt feels she is having difficulty focusing. C/o anxiety, does not feel her medications are effective.    Symptoms persist and remain impairing    The patient denies any side effects to medications.        Interim/overnight events per report/notes:          Psychiatric ROS (observed, reported, or endorsed/denied):  Depressed mood - Yes  Interest/pleasure/anhedonia: Yes  Guilt/hopelessness/worthlessness - Yes  Changes in Sleep - Yes  Changes in Appetite - No  Changes in Concentration - No  Changes in Energy - Yes  PMA/R- No  Suicidal- active/passive ideations - No  Homicidal ideations: active/passive ideations - No    Hallucinations - No  Delusions - No  Disorganized behavior - No  Disorganized speech - No  Negative symptoms - No    Elevated mood - No  Decreased need for sleep - No  Grandiosity - No  Racing thoughts - No  Impulsivity - No  Irritability- No  Increased energy - No  Distractibility - No  Increase in goal-directed activity or PMA- No    Symptoms of ZEE - Yes  Symptoms of Panic Disorder- No  Symptoms of PTSD - No        Overall progress: Patient is showing no improvement on the Unit to date              Medical " ROS  Review of Systems   Constitutional: Negative.    HENT: Negative.     Eyes: Negative.    Respiratory: Negative.     Cardiovascular: Negative.    Gastrointestinal: Negative.    Genitourinary: Negative.    Musculoskeletal: Negative.    Skin: Negative.    Neurological: Negative.    Endo/Heme/Allergies: Negative.    Psychiatric/Behavioral:  The patient is nervous/anxious.          PAST MEDICAL HISTORY   Past Medical History:   Diagnosis Date    Bipolar 1 disorder     Diabetes mellitus            PSYCHOTROPIC MEDICATIONS   Scheduled Meds:   busPIRone  10 mg Oral BID    docusate sodium  100 mg Oral Daily    gabapentin  300 mg Oral TID    insulin glargine U-100  20 Units Subcutaneous QHS    lurasidone  40 mg Oral with dinner    mupirocin   Nasal BID    nitrofurantoin (macrocrystal-monohydrate)  100 mg Oral Q12H    propranoloL  10 mg Oral BID    traZODone  100 mg Oral QHS     Continuous Infusions:  PRN Meds:.  Current Facility-Administered Medications:     aluminum-magnesium hydroxide-simethicone, 30 mL, Oral, Q6H PRN    benzonatate, 100 mg, Oral, TID PRN    benztropine mesylate, 2 mg, Intramuscular, Q8H PRN    dextrose 50%, 12.5 g, Intravenous, PRN    dextrose 50%, 25 g, Intravenous, PRN    glucagon (human recombinant), 1 mg, Intramuscular, PRN    glucose, 16 g, Oral, PRN    glucose, 24 g, Oral, PRN    hydrOXYzine pamoate, 50 mg, Oral, Q6H PRN    ibuprofen, 400 mg, Oral, Q6H PRN    insulin aspart U-100, 0-5 Units, Subcutaneous, QID (AC + HS) PRN    loperamide, 2 mg, Oral, PRN    magnesium hydroxide 400 mg/5 ml, 30 mL, Oral, Daily PRN    nicotine, 1 patch, Transdermal, Daily PRN    OLANZapine, 10 mg, Oral, Q8H PRN **AND** OLANZapine, 10 mg, Intramuscular, Q8H PRN    ondansetron, 4 mg, Oral, Q8H PRN    promethazine, 25 mg, Oral, Q6H PRN        EXAMINATION    VITALS   Vitals:    06/12/25 1915 06/13/25 0800 06/13/25 1926 06/14/25 0730   BP: (!) 132/59 130/86 136/80 (!) 150/72   BP Location: Left arm Left arm Left arm Left  arm   Patient Position: Sitting Sitting Sitting Sitting   Pulse: 106 82 97 89   Resp: 20 18 18 18   Temp: 98.5 °F (36.9 °C) 97.9 °F (36.6 °C) 98.6 °F (37 °C) 97.7 °F (36.5 °C)   TempSrc: Oral Oral Oral Oral   SpO2: 100% 99% 99% 99%   Weight:       Height:           Body mass index is 23.67 kg/m².        CONSTITUTIONAL  General Appearance: unremarkable, age appropriate    MUSCULOSKELETAL  Muscle Strength and Tone:no tremor, no tic  Abnormal Involuntary Movements: No  Gait and Station: non-ataxic    PSYCHIATRIC   Level of Consciousness: awake, alert , and oriented  Orientation: person, place, time, and situation  Grooming: Casually dressed and Well groomed  Psychomotor Behavior: normal, cooperative  Speech: normal tone, normal rate, normal pitch, normal volume  Language: grossly intact  Mood: anxious and depressed  Affect: Consistent with mood  Thought Process: linear, logical  Associations: intact   Thought Content: denies SI and denies HI  Perceptions: denies AH, denies  VH, and not RIS  Memory: Able to recall past events, Remote intact, and Recent intact  Attention:Attends to interview without distraction  Fund of Knowledge: Aware of current events and Vocabulary appropriate   Estimate if Intelligence:  Average based on work/education history, vocabulary and mental status exam  Insight: has awareness of illness  Judgment: behavior is adequate to circumstances        DIAGNOSTIC TESTING   Laboratory Results  Recent Results (from the past 24 hours)   POCT glucose    Collection Time: 06/13/25  4:48 PM   Result Value Ref Range    POCT Glucose 458 (HH) 70 - 110 mg/dL   POCT glucose    Collection Time: 06/13/25  8:13 PM   Result Value Ref Range    POCT Glucose 107 70 - 110 mg/dL   POCT glucose    Collection Time: 06/14/25  5:03 AM   Result Value Ref Range    POCT Glucose 224 (H) 70 - 110 mg/dL   POCT glucose    Collection Time: 06/14/25 11:30 AM   Result Value Ref Range    POCT Glucose 265 (H) 70 - 110 mg/dL              MEDICAL DECISION MAKING      ASSESSMENT      Bipolar Disorder NOS, mre depressed severe without psychotic features  Unspecified Specified Anxiety Disorder     Psychosocial stressors     Nicotine dependence  Cannabis abuse     NIDDM        PROBLEM LIST AND MANAGEMENT PLANS         Bipolar Depression: pt counseled  -hold home Trileptal at 150 mg po BID  -start trial of Latuda 20 mg po q day with dinner -Increase to 40 mg -Continue     Anxiety Disorder: pt counseled  -start trial of Buspar 5 mg po BID-Increase to 10 mg BID  -stop  -Initiate gabapentin 100 mg TID off label for anxiety/slee, occasional peripheral neuropathy-Increase to 300 mg TID  -increase to 400 mg PO TID  -Taper inderal to 10 mg BID with plan to DC (ineffective per patient)-taper to 10 mg daily  -Initiate trazodone 50 mg qhs-Increase to 100 mg qhs     Psychosocial stressors: pt counseled  -SW consulted to assist with resources     Nicotine dependence: pt counseled  -start nicotine 14 mg patch dermal     Cannabis abuse: pt counseled     NIDDM: pt counseled  -med consulted  -check HgA1c          Discussed diagnosis, risks and benefits of proposed treatment vs alternative treatments vs no treatment, potential side effects of these treatments and the inherent unpredictability of treatment. The patient expresses understanding of the above and displays the capacity to agree with this treatment given said understanding. Patient also agrees that, currently, the benefits outweigh the risks and would like to pursue/continue treatment at this time.    Any medications being used off-label were discussed with the patient inclusive of the evidence base for the use of the medications and consent was obtained for the off-label use of the medication.       DISCHARGE PLANNING  Expected Disposition Plan: Home or Self Care      NEED FOR CONTINUED HOSPITALIZATION  Psychiatric illness continues to pose a potential threat to life or bodily function, of self or  others, thereby requiring the need for continued inpatient psychiatric hospitalization: Yes, due to: danger to self, as evidenced by:  Ongoing concerns with SI.    Protective inpatient pyschiatric hospitalization required while a safe disposition plan is enacted: Yes    Patient stabilized and ready for discharge from inpatient psychiatric unit: No      The patient location is: [facility]    Visit type: audiovisual    Face to Face time with patient: 10  12 minutes of total time spent on the encounter, which includes face to face time and non-face to face time preparing to see the patient (eg, review of tests), Obtaining and/or reviewing separately obtained history, Documenting clinical information in the electronic or other health record, Independently interpreting results (not separately reported) and communicating results to the patient/family/caregiver, or Care coordination (not separately reported).     Each patient to whom he or she provides medical services by telemedicine is:  (1) informed of the relationship between the physician and patient and the respective role of any other health care provider with respect to management of the patient; and (2) notified that he or she may decline to receive medical services by telemedicine and may withdraw from such care at any time.      STAFF:   Tereso Disla III, MD  Psychiatry

## 2025-06-14 NOTE — PLAN OF CARE
Problem: Adult Behavioral Health Plan of Care  Goal: Plan of Care Review  Outcome: Progressing  Goal: Patient-Specific Goal (Individualization)  Outcome: Progressing  Goal: Adheres to Safety Considerations for Self and Others  Outcome: Progressing  Goal: Absence of New-Onset Illness or Injury  Outcome: Progressing  Goal: Optimized Coping Skills in Response to Life Stressors  Outcome: Progressing  Goal: Develops/Participates in Therapeutic [place] to Support Successful Transition  Outcome: Progressing  Goal: Rounds/Family Conference  Outcome: Progressing     Problem: Anxiety Signs/Symptoms  Goal: Optimized Energy Level (Anxiety Signs/Symptoms)  Outcome: Progressing  Goal: Optimized Cognitive Function (Anxiety Signs/Symptoms)  Outcome: Progressing  Goal: Improved Mood Symptoms (Anxiety Signs/Symptoms)  Outcome: Progressing  Goal: Improved Sleep (Anxiety Signs/Symptoms)  Outcome: Progressing  Goal: Enhanced Social, Occupational or Functional Skills (Anxiety Signs/Symptoms)  Outcome: Progressing  Goal: Improved Somatic Symptoms (Anxiety Signs/Symptoms)  Outcome: Progressing     Problem: Coping Ineffective  Goal: Effective Coping  Outcome: Progressing

## 2025-06-14 NOTE — NURSING
Pt. Was noticed to be withdrawn to her room most of this shift. She did come out for snacks and medications. Pt. Denied any c/o auditory or visual hallucinations. She also denied suicidal or homicidal ideations. She took her night medications without diff. Will cont. To monitor Pt.

## 2025-06-14 NOTE — NURSING
POC reviewed this shift and is ongoing.  Pt is cooperative with care and compliant with medications.  Pt denies SI/HI/AVH, endorses anxiety and difficulty focusing.  Pt remains primarily isolated to self and room, no peer interactions noted this shift.  Pt out for meals and snacks.

## 2025-06-15 LAB
POCT GLUCOSE: 232 MG/DL (ref 70–110)
POCT GLUCOSE: 244 MG/DL (ref 70–110)
POCT GLUCOSE: 280 MG/DL (ref 70–110)
POCT GLUCOSE: 325 MG/DL (ref 70–110)

## 2025-06-15 PROCEDURE — 25000003 PHARM REV CODE 250: Performed by: NURSE PRACTITIONER

## 2025-06-15 PROCEDURE — 25000003 PHARM REV CODE 250: Performed by: STUDENT IN AN ORGANIZED HEALTH CARE EDUCATION/TRAINING PROGRAM

## 2025-06-15 PROCEDURE — S4991 NICOTINE PATCH NONLEGEND: HCPCS | Performed by: PSYCHIATRY & NEUROLOGY

## 2025-06-15 PROCEDURE — 25000003 PHARM REV CODE 250: Performed by: PSYCHIATRY & NEUROLOGY

## 2025-06-15 PROCEDURE — 99233 SBSQ HOSP IP/OBS HIGH 50: CPT | Mod: AF,HB,, | Performed by: STUDENT IN AN ORGANIZED HEALTH CARE EDUCATION/TRAINING PROGRAM

## 2025-06-15 PROCEDURE — 11400000 HC PSYCH PRIVATE ROOM

## 2025-06-15 RX ADMIN — NITROFURANTOIN MONOHYDRATE/MACROCRYSTALS 100 MG: 25; 75 CAPSULE ORAL at 08:06

## 2025-06-15 RX ADMIN — TRAZODONE HYDROCHLORIDE 100 MG: 100 TABLET ORAL at 08:06

## 2025-06-15 RX ADMIN — GABAPENTIN 400 MG: 400 CAPSULE ORAL at 08:06

## 2025-06-15 RX ADMIN — INSULIN ASPART 1 UNITS: 100 INJECTION, SOLUTION INTRAVENOUS; SUBCUTANEOUS at 08:06

## 2025-06-15 RX ADMIN — PROPRANOLOL HYDROCHLORIDE 10 MG: 10 TABLET ORAL at 08:06

## 2025-06-15 RX ADMIN — LURASIDONE HYDROCHLORIDE 60 MG: 40 TABLET, FILM COATED ORAL at 04:06

## 2025-06-15 RX ADMIN — IBUPROFEN 400 MG: 400 TABLET, FILM COATED ORAL at 08:06

## 2025-06-15 RX ADMIN — INSULIN ASPART 2 UNITS: 100 INJECTION, SOLUTION INTRAVENOUS; SUBCUTANEOUS at 05:06

## 2025-06-15 RX ADMIN — INSULIN ASPART 4 UNITS: 100 INJECTION, SOLUTION INTRAVENOUS; SUBCUTANEOUS at 04:06

## 2025-06-15 RX ADMIN — Medication 9 MG: at 08:06

## 2025-06-15 RX ADMIN — DOCUSATE SODIUM 100 MG: 100 CAPSULE, LIQUID FILLED ORAL at 08:06

## 2025-06-15 RX ADMIN — INSULIN GLARGINE 20 UNITS: 100 INJECTION, SOLUTION SUBCUTANEOUS at 08:06

## 2025-06-15 RX ADMIN — GABAPENTIN 400 MG: 400 CAPSULE ORAL at 02:06

## 2025-06-15 RX ADMIN — IBUPROFEN 400 MG: 400 TABLET, FILM COATED ORAL at 11:06

## 2025-06-15 RX ADMIN — NICOTINE 1 PATCH: 14 PATCH, EXTENDED RELEASE TRANSDERMAL at 08:06

## 2025-06-15 RX ADMIN — INSULIN ASPART 2 UNITS: 100 INJECTION, SOLUTION INTRAVENOUS; SUBCUTANEOUS at 11:06

## 2025-06-15 NOTE — NURSING
Pt. Noticed to be sitting alone in dining room watching nurse's station. She denied auditory or visual hallucinations. Pt. Also denied suicidal or homicidal ideations. She took a shower tonight. Pt.'s blood glucose via fingerstick was 250 and insulin was admin. As ordered. Educated Pt. On diet. She took her night medications without diff. Will cont. To monitor Pt.

## 2025-06-15 NOTE — NURSING
Patient requesting prn pain medication for toothache, received medication as ordered, see emar for dosages. Patient currently sitting in activity room coloring. States medication is effective.

## 2025-06-15 NOTE — PROGRESS NOTES
"PSYCHIATRY DAILY INPATIENT PROGRESS NOTE  SUBSEQUENT HOSPITAL VISIT    ENCOUNTER DATE: 6/15/2025  SITE: [facility]    DATE OF ADMISSION: 6/10/2025  1:45 AM  LENGTH OF STAY: 5 days      CHIEF COMPLAINT   Sandrine Roland is a 50 y.o. female, seen during daily smith rounds on the inpatient unit.  Sandrine Roland presented with the chief complaint of anxiety, "I was having anxiety attacks and couldn't breathe."      The patient was seen and examined. The chart was reviewed.     Reviewed notes from Rns and MD and labs from the last 24 hours.    The patient's case was discussed with the treatment team/care providers today including Rns and MD    Staff reports no behavioral or management issues.     The patient has been compliant with treatment.      Subjective 06/15/2025     Pt states "I don't feel any positive energy in me," endorses depressed mood and poor sleep.    Pt states "I still feel anxious but not as bad."    Symptoms persist and remain impairing    The patient denies any side effects to medications.        Interim/overnight events per report/notes:          Psychiatric ROS (observed, reported, or endorsed/denied):  Depressed mood - Yes  Interest/pleasure/anhedonia: Yes  Guilt/hopelessness/worthlessness - Yes  Changes in Sleep - Yes  Changes in Appetite - No  Changes in Concentration - No  Changes in Energy - Yes  PMA/R- No  Suicidal- active/passive ideations - No  Homicidal ideations: active/passive ideations - No    Hallucinations - No  Delusions - No  Disorganized behavior - No  Disorganized speech - No  Negative symptoms - No    Elevated mood - No  Decreased need for sleep - No  Grandiosity - No  Racing thoughts - No  Impulsivity - No  Irritability- No  Increased energy - No  Distractibility - No  Increase in goal-directed activity or PMA- No    Symptoms of ZEE - Yes  Symptoms of Panic Disorder- No  Symptoms of PTSD - No        Overall progress: Patient is showing mild improvement "               Medical ROS  Review of Systems   Constitutional: Negative.    HENT: Negative.     Eyes: Negative.    Respiratory: Negative.     Cardiovascular: Negative.    Gastrointestinal: Negative.    Genitourinary: Negative.    Musculoskeletal: Negative.    Skin: Negative.    Neurological: Negative.    Endo/Heme/Allergies: Negative.    Psychiatric/Behavioral:  The patient is nervous/anxious.          PAST MEDICAL HISTORY   Past Medical History:   Diagnosis Date    Bipolar 1 disorder     Diabetes mellitus            PSYCHOTROPIC MEDICATIONS   Scheduled Meds:   docusate sodium  100 mg Oral Daily    gabapentin  400 mg Oral TID    insulin glargine U-100  20 Units Subcutaneous QHS    lurasidone  40 mg Oral with dinner    melatonin  9 mg Oral Nightly    nitrofurantoin (macrocrystal-monohydrate)  100 mg Oral Q12H    propranoloL  10 mg Oral Daily    traZODone  100 mg Oral QHS     Continuous Infusions:  PRN Meds:.  Current Facility-Administered Medications:     aluminum-magnesium hydroxide-simethicone, 30 mL, Oral, Q6H PRN    benzonatate, 100 mg, Oral, TID PRN    benztropine mesylate, 2 mg, Intramuscular, Q8H PRN    dextrose 50%, 12.5 g, Intravenous, PRN    dextrose 50%, 25 g, Intravenous, PRN    glucagon (human recombinant), 1 mg, Intramuscular, PRN    glucose, 16 g, Oral, PRN    glucose, 24 g, Oral, PRN    hydrOXYzine pamoate, 50 mg, Oral, Q6H PRN    ibuprofen, 400 mg, Oral, Q6H PRN    insulin aspart U-100, 0-5 Units, Subcutaneous, QID (AC + HS) PRN    loperamide, 2 mg, Oral, PRN    magnesium hydroxide 400 mg/5 ml, 30 mL, Oral, Daily PRN    nicotine, 1 patch, Transdermal, Daily PRN    OLANZapine, 10 mg, Oral, Q8H PRN **AND** OLANZapine, 10 mg, Intramuscular, Q8H PRN    ondansetron, 4 mg, Oral, Q8H PRN    promethazine, 25 mg, Oral, Q6H PRN        EXAMINATION    VITALS   Vitals:    06/13/25 1926 06/14/25 0730 06/14/25 1928 06/15/25 0800   BP: 136/80 (!) 150/72 126/68 122/62   BP Location: Left arm Left arm Left arm Left  arm   Patient Position: Sitting Sitting Sitting Sitting   Pulse: 97 89 72 68   Resp: 18 18 20 18   Temp: 98.6 °F (37 °C) 97.7 °F (36.5 °C) 98.1 °F (36.7 °C) 97.6 °F (36.4 °C)   TempSrc: Oral Oral  Oral   SpO2: 99% 99% 100% 99%   Weight:       Height:           Body mass index is 23.67 kg/m².        CONSTITUTIONAL  General Appearance: unremarkable, age appropriate    MUSCULOSKELETAL  Muscle Strength and Tone:no tremor, no tic  Abnormal Involuntary Movements: No  Gait and Station: non-ataxic    PSYCHIATRIC   Level of Consciousness: awake, alert , and oriented  Orientation: person, place, time, and situation  Grooming: Casually dressed and Well groomed  Psychomotor Behavior: normal, cooperative  Speech: soft, non-spontaneous, monotone  Language: grossly intact  Mood: anxious and depressed  Affect: Consistent with mood  Thought Process: linear, logical  Associations: intact   Thought Content: denies SI and denies HI  Perceptions: denies AH, denies  VH, and not RIS  Memory: Able to recall past events, Remote intact, and Recent intact  Attention:Attends to interview without distraction  Fund of Knowledge: Aware of current events and Vocabulary appropriate   Estimate if Intelligence:  Average based on work/education history, vocabulary and mental status exam  Insight: has awareness of illness  Judgment: behavior is adequate to circumstances        DIAGNOSTIC TESTING   Laboratory Results  Recent Results (from the past 24 hours)   POCT glucose    Collection Time: 06/14/25  3:58 PM   Result Value Ref Range    POCT Glucose 239 (H) 70 - 110 mg/dL   POCT glucose    Collection Time: 06/14/25  7:38 PM   Result Value Ref Range    POCT Glucose 250 (H) 70 - 110 mg/dL   POCT glucose    Collection Time: 06/15/25  5:30 AM   Result Value Ref Range    POCT Glucose 232 (H) 70 - 110 mg/dL   POCT glucose    Collection Time: 06/15/25 11:29 AM   Result Value Ref Range    POCT Glucose 244 (H) 70 - 110 mg/dL             MEDICAL DECISION  MAKING      ASSESSMENT      Bipolar Disorder NOS, mre depressed severe without psychotic features  Unspecified Specified Anxiety Disorder     Psychosocial stressors     Nicotine dependence  Cannabis abuse     NIDDM        PROBLEM LIST AND MANAGEMENT PLANS         Bipolar Depression: pt counseled  -hold home Trileptal at 150 mg po BID  -start trial of Latuda 20 mg po q day with dinner -Increase to 40 mg - increase to 60 mg PO qd tonight     Anxiety Disorder: pt counseled  -start trial of Buspar 5 mg po BID-Increase to 10 mg BID  -stop  -Initiate gabapentin 100 mg TID off label for anxiety/slee, occasional peripheral neuropathy-Increase to 300 mg TID  -increase to 400 mg PO TID  -Taper inderal to 10 mg BID with plan to DC (ineffective per patient)-taper to 10 mg daily  -Initiate trazodone 50 mg qhs-Increase to 100 mg qhs     Psychosocial stressors: pt counseled  -SW consulted to assist with resources     Nicotine dependence: pt counseled  -start nicotine 14 mg patch dermal     Cannabis abuse: pt counseled     NIDDM: pt counseled  -med consulted  -check HgA1c          Discussed diagnosis, risks and benefits of proposed treatment vs alternative treatments vs no treatment, potential side effects of these treatments and the inherent unpredictability of treatment. The patient expresses understanding of the above and displays the capacity to agree with this treatment given said understanding. Patient also agrees that, currently, the benefits outweigh the risks and would like to pursue/continue treatment at this time.    Any medications being used off-label were discussed with the patient inclusive of the evidence base for the use of the medications and consent was obtained for the off-label use of the medication.       DISCHARGE PLANNING  Expected Disposition Plan: Home or Self Care      NEED FOR CONTINUED HOSPITALIZATION  Psychiatric illness continues to pose a potential threat to life or bodily function, of self or  others, thereby requiring the need for continued inpatient psychiatric hospitalization: Yes, due to: danger to self, as evidenced by:  Ongoing concerns with SI.    Protective inpatient pyschiatric hospitalization required while a safe disposition plan is enacted: Yes    Patient stabilized and ready for discharge from inpatient psychiatric unit: No      The patient location is: [facility]    Visit type: audiovisual    Face to Face time with patient: 10  12 minutes of total time spent on the encounter, which includes face to face time and non-face to face time preparing to see the patient (eg, review of tests), Obtaining and/or reviewing separately obtained history, Documenting clinical information in the electronic or other health record, Independently interpreting results (not separately reported) and communicating results to the patient/family/caregiver, or Care coordination (not separately reported).     Each patient to whom he or she provides medical services by telemedicine is:  (1) informed of the relationship between the physician and patient and the respective role of any other health care provider with respect to management of the patient; and (2) notified that he or she may decline to receive medical services by telemedicine and may withdraw from such care at any time.      STAFF:   Tereso Disla III, MD  Psychiatry

## 2025-06-15 NOTE — PROGRESS NOTES
"PSYCHIATRY DAILY INPATIENT PROGRESS NOTE  SUBSEQUENT HOSPITAL VISIT    ENCOUNTER DATE: 6/15/2025  SITE: [facility]    DATE OF ADMISSION: 6/10/2025  1:45 AM  LENGTH OF STAY: 5 days      CHIEF COMPLAINT   Sandrine Roland is a 50 y.o. female, seen during daily smith rounds on the inpatient unit.  Sandrine Roland presented with the chief complaint of anxiety, "I was having anxiety attacks and couldn't breathe."      The patient was seen and examined. The chart was reviewed.     Reviewed notes from Rns and MD and labs from the last 24 hours.    The patient's case was discussed with the treatment team/care providers today including Rns and MD    Staff reports no behavioral or management issues.     The patient has been compliant with treatment.      Subjective 06/15/2025     Pt states "I don't feel any positive energy in me," endorses depressed mood and poor sleep.    Pt states "I still feel anxious but not as bad."    Symptoms persist and remain impairing    The patient denies any side effects to medications.        Interim/overnight events per report/notes:          Psychiatric ROS (observed, reported, or endorsed/denied):  Depressed mood - Yes  Interest/pleasure/anhedonia: Yes  Guilt/hopelessness/worthlessness - Yes  Changes in Sleep - Yes  Changes in Appetite - No  Changes in Concentration - No  Changes in Energy - Yes  PMA/R- No  Suicidal- active/passive ideations - No  Homicidal ideations: active/passive ideations - No    Hallucinations - No  Delusions - No  Disorganized behavior - No  Disorganized speech - No  Negative symptoms - No    Elevated mood - No  Decreased need for sleep - No  Grandiosity - No  Racing thoughts - No  Impulsivity - No  Irritability- No  Increased energy - No  Distractibility - No  Increase in goal-directed activity or PMA- No    Symptoms of ZEE - Yes  Symptoms of Panic Disorder- No  Symptoms of PTSD - No        Overall progress: Patient is showing mild improvement "               Medical ROS  Review of Systems   Constitutional: Negative.    HENT: Negative.     Eyes: Negative.    Respiratory: Negative.     Cardiovascular: Negative.    Gastrointestinal: Negative.    Genitourinary: Negative.    Musculoskeletal: Negative.    Skin: Negative.    Neurological: Negative.    Endo/Heme/Allergies: Negative.    Psychiatric/Behavioral:  The patient is nervous/anxious.          PAST MEDICAL HISTORY   Past Medical History:   Diagnosis Date    Bipolar 1 disorder     Diabetes mellitus            PSYCHOTROPIC MEDICATIONS   Scheduled Meds:   docusate sodium  100 mg Oral Daily    gabapentin  400 mg Oral TID    insulin glargine U-100  20 Units Subcutaneous QHS    lurasidone  40 mg Oral with dinner    melatonin  9 mg Oral Nightly    nitrofurantoin (macrocrystal-monohydrate)  100 mg Oral Q12H    propranoloL  10 mg Oral Daily    traZODone  100 mg Oral QHS     Continuous Infusions:  PRN Meds:.  Current Facility-Administered Medications:     aluminum-magnesium hydroxide-simethicone, 30 mL, Oral, Q6H PRN    benzonatate, 100 mg, Oral, TID PRN    benztropine mesylate, 2 mg, Intramuscular, Q8H PRN    dextrose 50%, 12.5 g, Intravenous, PRN    dextrose 50%, 25 g, Intravenous, PRN    glucagon (human recombinant), 1 mg, Intramuscular, PRN    glucose, 16 g, Oral, PRN    glucose, 24 g, Oral, PRN    hydrOXYzine pamoate, 50 mg, Oral, Q6H PRN    ibuprofen, 400 mg, Oral, Q6H PRN    insulin aspart U-100, 0-5 Units, Subcutaneous, QID (AC + HS) PRN    loperamide, 2 mg, Oral, PRN    magnesium hydroxide 400 mg/5 ml, 30 mL, Oral, Daily PRN    nicotine, 1 patch, Transdermal, Daily PRN    OLANZapine, 10 mg, Oral, Q8H PRN **AND** OLANZapine, 10 mg, Intramuscular, Q8H PRN    ondansetron, 4 mg, Oral, Q8H PRN    promethazine, 25 mg, Oral, Q6H PRN        EXAMINATION    VITALS   Vitals:    06/13/25 1926 06/14/25 0730 06/14/25 1928 06/15/25 0800   BP: 136/80 (!) 150/72 126/68 122/62   BP Location: Left  arm Left arm Left arm Left arm   Patient Position: Sitting Sitting Sitting Sitting   Pulse: 97 89 72 68   Resp: 18 18 20 18   Temp: 98.6 °F (37 °C) 97.7 °F (36.5 °C) 98.1 °F (36.7 °C) 97.6 °F (36.4 °C)   TempSrc: Oral Oral  Oral   SpO2: 99% 99% 100% 99%   Weight:       Height:           Body mass index is 23.67 kg/m².        CONSTITUTIONAL  General Appearance: unremarkable, age appropriate    MUSCULOSKELETAL  Muscle Strength and Tone:no tremor, no tic  Abnormal Involuntary Movements: No  Gait and Station: non-ataxic    PSYCHIATRIC   Level of Consciousness: awake, alert , and oriented  Orientation: person, place, time, and situation  Grooming: Casually dressed and Well groomed  Psychomotor Behavior: normal, cooperative  Speech: normal tone, normal rate, normal pitch, normal volume  Language: grossly intact  Mood: anxious and depressed  Affect: Consistent with mood  Thought Process: linear, logical  Associations: intact   Thought Content: denies SI and denies HI  Perceptions: denies AH, denies  VH, and not RIS  Memory: Able to recall past events, Remote intact, and Recent intact  Attention:Attends to interview without distraction  Fund of Knowledge: Aware of current events and Vocabulary appropriate   Estimate if Intelligence:  Average based on work/education history, vocabulary and mental status exam  Insight: has awareness of illness  Judgment: behavior is adequate to circumstances        DIAGNOSTIC TESTING   Laboratory Results  Recent Results (from the past 24 hours)   POCT glucose    Collection Time: 06/14/25  3:58 PM   Result Value Ref Range    POCT Glucose 239 (H) 70 - 110 mg/dL   POCT glucose    Collection Time: 06/14/25  7:38 PM   Result Value Ref Range    POCT Glucose 250 (H) 70 - 110 mg/dL   POCT glucose    Collection Time: 06/15/25  5:30 AM   Result Value Ref Range    POCT Glucose 232 (H) 70 - 110 mg/dL   POCT glucose    Collection Time: 06/15/25 11:29 AM   Result Value Ref Range    POCT Glucose 244 (H) 70  - 110 mg/dL             MEDICAL DECISION MAKING      ASSESSMENT      Bipolar Disorder NOS, mre depressed severe without psychotic features  Unspecified Specified Anxiety Disorder     Psychosocial stressors     Nicotine dependence  Cannabis abuse     NIDDM        PROBLEM LIST AND MANAGEMENT PLANS         Bipolar Depression: pt counseled  -hold home Trileptal at 150 mg po BID  -start trial of Latuda 20 mg po q day with dinner -Increase to 40 mg -Continue     Anxiety Disorder: pt counseled  -start trial of Buspar 5 mg po BID-Increase to 10 mg BID  -stop  -Initiate gabapentin 100 mg TID off label for anxiety/slee, occasional peripheral neuropathy-Increase to 300 mg TID  -increase to 400 mg PO TID  -Taper inderal to 10 mg BID with plan to DC (ineffective per patient)-taper to 10 mg daily  -Initiate trazodone 50 mg qhs-Increase to 100 mg qhs     Psychosocial stressors: pt counseled  -SW consulted to assist with resources     Nicotine dependence: pt counseled  -start nicotine 14 mg patch dermal     Cannabis abuse: pt counseled     NIDDM: pt counseled  -med consulted  -check HgA1c          Discussed diagnosis, risks and benefits of proposed treatment vs alternative treatments vs no treatment, potential side effects of these treatments and the inherent unpredictability of treatment. The patient expresses understanding of the above and displays the capacity to agree with this treatment given said understanding. Patient also agrees that, currently, the benefits outweigh the risks and would like to pursue/continue treatment at this time.    Any medications being used off-label were discussed with the patient inclusive of the evidence base for the use of the medications and consent was obtained for the off-label use of the medication.       DISCHARGE PLANNING  Expected Disposition Plan: Home or Self Care      NEED FOR CONTINUED HOSPITALIZATION  Psychiatric illness continues to pose a potential threat to life or bodily function,  of self or others, thereby requiring the need for continued inpatient psychiatric hospitalization: Yes, due to: danger to self, as evidenced by:  Ongoing concerns with SI.    Protective inpatient pyschiatric hospitalization required while a safe disposition plan is enacted: Yes    Patient stabilized and ready for discharge from inpatient psychiatric unit: No      The patient location is: [facility]    Visit type: audiovisual    Face to Face time with patient: 10  12 minutes of total time spent on the encounter, which includes face to face time and non-face to face time preparing to see the patient (eg, review of tests), Obtaining and/or reviewing separately obtained history, Documenting clinical information in the electronic or other health record, Independently interpreting results (not separately reported) and communicating results to the patient/family/caregiver, or Care coordination (not separately reported).     Each patient to whom he or she provides medical services by telemedicine is:  (1) informed of the relationship between the physician and patient and the respective role of any other health care provider with respect to management of the patient; and (2) notified that he or she may decline to receive medical services by telemedicine and may withdraw from such care at any time.      STAFF:   Tereso Disla III, MD  Psychiatry

## 2025-06-15 NOTE — PLAN OF CARE
Problem: Adult Behavioral Health Plan of Care  Goal: Plan of Care Review  Outcome: Progressing  Goal: Patient-Specific Goal (Individualization)  Outcome: Progressing  Goal: Adheres to Safety Considerations for Self and Others  Outcome: Progressing  Goal: Absence of New-Onset Illness or Injury  Outcome: Progressing  Goal: Optimized Coping Skills in Response to Life Stressors  Outcome: Progressing  Goal: Develops/Participates in Therapeutic [place] to Support Successful Transition  Outcome: Progressing  Goal: Rounds/Family Conference  Outcome: Progressing     Problem: Anxiety Signs/Symptoms  Goal: Optimized Cognitive Function (Anxiety Signs/Symptoms)  Outcome: Progressing  Goal: Improved Sleep (Anxiety Signs/Symptoms)  Outcome: Progressing  Goal: Enhanced Social, Occupational or Functional Skills (Anxiety Signs/Symptoms)  Outcome: Progressing  Goal: Improved Somatic Symptoms (Anxiety Signs/Symptoms)  Outcome: Progressing     Problem: Coping Ineffective  Goal: Effective Coping  Outcome: Progressing

## 2025-06-16 LAB
POCT GLUCOSE: 164 MG/DL (ref 70–110)
POCT GLUCOSE: 244 MG/DL (ref 70–110)
POCT GLUCOSE: 251 MG/DL (ref 70–110)
POCT GLUCOSE: 318 MG/DL (ref 70–110)

## 2025-06-16 PROCEDURE — 11400000 HC PSYCH PRIVATE ROOM

## 2025-06-16 PROCEDURE — 25000003 PHARM REV CODE 250: Performed by: PSYCHIATRY & NEUROLOGY

## 2025-06-16 PROCEDURE — 25000003 PHARM REV CODE 250: Performed by: NURSE PRACTITIONER

## 2025-06-16 PROCEDURE — 99232 SBSQ HOSP IP/OBS MODERATE 35: CPT | Mod: AF,HB,, | Performed by: STUDENT IN AN ORGANIZED HEALTH CARE EDUCATION/TRAINING PROGRAM

## 2025-06-16 PROCEDURE — 25000003 PHARM REV CODE 250: Performed by: STUDENT IN AN ORGANIZED HEALTH CARE EDUCATION/TRAINING PROGRAM

## 2025-06-16 PROCEDURE — 90833 PSYTX W PT W E/M 30 MIN: CPT | Mod: AF,HB,, | Performed by: STUDENT IN AN ORGANIZED HEALTH CARE EDUCATION/TRAINING PROGRAM

## 2025-06-16 RX ORDER — TRAZODONE HYDROCHLORIDE 50 MG/1
50 TABLET ORAL NIGHTLY PRN
Status: DISCONTINUED | OUTPATIENT
Start: 2025-06-16 | End: 2025-06-18 | Stop reason: HOSPADM

## 2025-06-16 RX ORDER — HYDROXYZINE PAMOATE 50 MG/1
50 CAPSULE ORAL EVERY 6 HOURS PRN
Status: DISCONTINUED | OUTPATIENT
Start: 2025-06-16 | End: 2025-06-18 | Stop reason: HOSPADM

## 2025-06-16 RX ADMIN — PROPRANOLOL HYDROCHLORIDE 10 MG: 10 TABLET ORAL at 08:06

## 2025-06-16 RX ADMIN — DOCUSATE SODIUM 100 MG: 100 CAPSULE, LIQUID FILLED ORAL at 08:06

## 2025-06-16 RX ADMIN — INSULIN ASPART 1 UNITS: 100 INJECTION, SOLUTION INTRAVENOUS; SUBCUTANEOUS at 09:06

## 2025-06-16 RX ADMIN — LURASIDONE HYDROCHLORIDE 60 MG: 40 TABLET, FILM COATED ORAL at 04:06

## 2025-06-16 RX ADMIN — GABAPENTIN 400 MG: 400 CAPSULE ORAL at 08:06

## 2025-06-16 RX ADMIN — GABAPENTIN 400 MG: 400 CAPSULE ORAL at 02:06

## 2025-06-16 RX ADMIN — NITROFURANTOIN MONOHYDRATE/MACROCRYSTALS 100 MG: 25; 75 CAPSULE ORAL at 08:06

## 2025-06-16 RX ADMIN — Medication 9 MG: at 08:06

## 2025-06-16 RX ADMIN — INSULIN GLARGINE 20 UNITS: 100 INJECTION, SOLUTION SUBCUTANEOUS at 09:06

## 2025-06-16 RX ADMIN — TRAZODONE HYDROCHLORIDE 100 MG: 100 TABLET ORAL at 08:06

## 2025-06-16 RX ADMIN — INSULIN ASPART 4 UNITS: 100 INJECTION, SOLUTION INTRAVENOUS; SUBCUTANEOUS at 11:06

## 2025-06-16 RX ADMIN — INSULIN ASPART 2 UNITS: 100 INJECTION, SOLUTION INTRAVENOUS; SUBCUTANEOUS at 04:06

## 2025-06-16 NOTE — PLAN OF CARE
Problem: Adult Behavioral Health Plan of Care  Goal: Plan of Care Review  Outcome: Progressing  Goal: Patient-Specific Goal (Individualization)  Outcome: Progressing  Goal: Adheres to Safety Considerations for Self and Others  Outcome: Progressing  Goal: Absence of New-Onset Illness or Injury  Outcome: Progressing  Goal: Optimized Coping Skills in Response to Life Stressors  Outcome: Progressing  Goal: Develops/Participates in Therapeutic [place] to Support Successful Transition  Outcome: Progressing  Goal: Rounds/Family Conference  Outcome: Progressing     Problem: Anxiety Signs/Symptoms  Goal: Optimized Energy Level (Anxiety Signs/Symptoms)  Outcome: Progressing  Goal: Optimized Cognitive Function (Anxiety Signs/Symptoms)  Outcome: Progressing  Goal: Improved Mood Symptoms (Anxiety Signs/Symptoms)  Outcome: Progressing  Goal: Improved Sleep (Anxiety Signs/Symptoms)  Outcome: Progressing  Goal: Enhanced Social, Occupational or Functional Skills (Anxiety Signs/Symptoms)  Outcome: Progressing  Goal: Improved Somatic Symptoms (Anxiety Signs/Symptoms)  Outcome: Progressing     Problem: Coping Ineffective  Goal: Effective Coping  Outcome: Progressing

## 2025-06-16 NOTE — NURSING
Patient able to make needs known. No complaints noted on this shift. Patient CBG's continue to be up and down. Patient tolerating new order for lantus at night. Patient compliant with all other medication. Patient is compliant with staff and other patients. Patient participated in group[ session today. Is frequenting the dining area more, but does still have episodes of being withdrawn in her room. Patient does deny any SI/HI. Patient does deny any A/VH. Patient does report to [facility] staff that she is ready to go home and get back to a normal life. Patient did speak with medical staff about a plan for discharge. No other complaints noted at this time. Plan of care continued.

## 2025-06-16 NOTE — PLAN OF CARE
Problem: Adult Behavioral Health Plan of Care  Goal: Optimized Coping Skills in Response to Life Stressors  Intervention: Promote Effective Coping Strategies  Flowsheets (Taken 6/16/2025 6342)  Supportive Measures:   active listening utilized   counseling provided   decision-making supported   self-reflection promoted   self-responsibility promoted   verbalization of feelings encouraged       Behavior:  Patient arrived on time and was minimally engaged in group conversation. She shared only when directly called upon but engaged appropriately with peers. Patient presented as alert and oriented. She was observed actively listening to her peers.     Intervention:  Counselor facilitated the session by introducing the concept of forgiveness as a way of releasing personal resentment and emotional pain. The group was encouraged to share their thoughts and experiences with forgiveness. Counselor provided psychoeducation on the difference between forgiveness and condoning harmful behavior. Cognitive reframing techniques were introduced to help participants see forgiveness as a tool for emotional healing rather than an endorsement of past wrongdoings.    Response:  Patient responded well to the intervention. She demonstrated insight into the emotional benefits of forgiveness, and is nervous that her family may not be ready, willing, or able to forgive her for her transgressions. Patient expressed interest in learning how to effectively ask for forgiveness and apologize. Patient expressed gratitude for the topic and relief at hearing that her peers have had similar struggles.     Plan:  Patient will be encouraged to participate in MET/CBT- based groups 2-5 times per week and/or 1:1 as needed to address issues and coping strategies. Patient will participate in recreational groups 2-5 times per week and/or 1:1 as needed to build peer support and relaxation skills.

## 2025-06-16 NOTE — PLAN OF CARE
Problem: Adult Behavioral Health Plan of Care  Goal: Plan of Care Review  Outcome: Progressing     Problem: Adult Behavioral Health Plan of Care  Goal: Patient-Specific Goal (Individualization)  Outcome: Progressing     Problem: Adult Behavioral Health Plan of Care  Goal: Adheres to Safety Considerations for Self and Others  Outcome: Progressing     Problem: Adult Behavioral Health Plan of Care  Goal: Optimized Coping Skills in Response to Life Stressors  Outcome: Progressing     Problem: Adult Behavioral Health Plan of Care  Goal: Develops/Participates in Therapeutic [place] to Support Successful Transition  Outcome: Progressing     Problem: Adult Behavioral Health Plan of Care  Goal: Rounds/Family Conference  Outcome: Progressing     Problem: Anxiety Signs/Symptoms  Goal: Optimized Energy Level (Anxiety Signs/Symptoms)  Outcome: Progressing     Problem: Anxiety Signs/Symptoms  Goal: Optimized Cognitive Function (Anxiety Signs/Symptoms)  Outcome: Progressing     Problem: Anxiety Signs/Symptoms  Goal: Improved Mood Symptoms (Anxiety Signs/Symptoms)  Outcome: Progressing     Problem: Anxiety Signs/Symptoms  Goal: Improved Sleep (Anxiety Signs/Symptoms)  Outcome: Progressing     Problem: Anxiety Signs/Symptoms  Goal: Enhanced Social, Occupational or Functional Skills (Anxiety Signs/Symptoms)  Outcome: Progressing     Problem: Coping Ineffective  Goal: Effective Coping  Outcome: Progressing

## 2025-06-16 NOTE — NURSING
Pt. Was noticed to be sitting on unit watching TV with other Pts. She was not as anxious tonight and no anxiety was reported. Pt. Denied auditory or visual hallucinations. She also denied suicidal or homicidal ideations. Pt.'s blood glucose via fingerstick was 280 and insulin admin. As per doctor's orders. Will cont. To monitor Pt.

## 2025-06-16 NOTE — PROGRESS NOTES
"PSYCHIATRY DAILY INPATIENT PROGRESS NOTE  SUBSEQUENT HOSPITAL VISIT    ENCOUNTER DATE: 6/16/2025  SITE: [facility]    DATE OF ADMISSION: 6/10/2025  1:45 AM  LENGTH OF STAY: 6 days      CHIEF COMPLAINT   Sandrine Roland is a 50 y.o. female, seen during daily smith rounds on the inpatient unit.  Sandrine Roland presented with the chief complaint of anxiety, "I was having anxiety attacks and couldn't breathe."      The patient was seen and examined. The chart was reviewed.     Reviewed notes from Rns and labs from the last 24 hours.    The patient's case was discussed with the treatment team/care providers today including Rns    Staff reports no behavioral or management issues.     The patient has been compliant with treatment.      Subjective 06/16/2025     Pt states "I am doing better, I slept better, I am feeling more outgoing... I think the latuda is helping.. my anxiety is low."    Reports recent paranoia, "I thought my sister had people following me." Reports had supportive visit with her sister and received a card from her daughter, "it went good, I colored a picture for my kids."    Reports "I am not doing marijuana anymore." Discussed consequences of continued MJ abuse given h/o psychosis, pt voices committment to cessation. She is willing to attend IOP.    Symptoms persist and remain impairing    The patient denies any side effects to medications.      Psychotherapy:  Target symptoms: depression, anxiety , substance abuse, psychosis  Why chosen therapy is appropriate versus another modality: relevant to diagnosis  Outcome monitoring methods: self-report  Therapeutic intervention type: supportive psychotherapy  Topics discussed/themes: building skills sets for symptom management, symptom recognition, substance abuse  The patient's response to the intervention is accepting. The patient's progress toward treatment goals is fair.   Duration of intervention: 16 " minutes.          Interim/overnight events per report/notes:          Psychiatric ROS (observed, reported, or endorsed/denied):  Depressed mood - less  Interest/pleasure/anhedonia: less  Guilt/hopelessness/worthlessness - denies  Changes in Sleep - Yes  Changes in Appetite - No  Changes in Concentration - No  Changes in Energy - less  PMA/R- No  Suicidal- active/passive ideations - No  Homicidal ideations: active/passive ideations - No    Hallucinations - No  Delusions - No  Disorganized behavior - No  Disorganized speech - No  Negative symptoms - No    Elevated mood - No  Decreased need for sleep - No  Grandiosity - No  Racing thoughts - No  Impulsivity - No  Irritability- No  Increased energy - No  Distractibility - No  Increase in goal-directed activity or PMA- No    Symptoms of ZEE - Yes  Symptoms of Panic Disorder- No  Symptoms of PTSD - No        Overall progress: Patient is showing mild improvement               Medical ROS  Review of Systems   Constitutional: Negative.    HENT: Negative.     Eyes: Negative.    Respiratory: Negative.     Cardiovascular: Negative.    Gastrointestinal: Negative.    Genitourinary: Negative.    Musculoskeletal: Negative.    Skin: Negative.    Neurological: Negative.    Endo/Heme/Allergies: Negative.    Psychiatric/Behavioral:  The patient is nervous/anxious.          PAST MEDICAL HISTORY   Past Medical History:   Diagnosis Date    Bipolar 1 disorder     Diabetes mellitus            PSYCHOTROPIC MEDICATIONS   Scheduled Meds:   docusate sodium  100 mg Oral Daily    gabapentin  400 mg Oral TID    insulin glargine U-100  20 Units Subcutaneous QHS    lurasidone  60 mg Oral with dinner    melatonin  9 mg Oral Nightly    nitrofurantoin (macrocrystal-monohydrate)  100 mg Oral Q12H    traZODone  100 mg Oral QHS     Continuous Infusions:  PRN Meds:.  Current Facility-Administered Medications:     aluminum-magnesium hydroxide-simethicone, 30 mL, Oral, Q6H PRN    benzonatate, 100 mg, Oral,  TID PRN    benztropine mesylate, 2 mg, Intramuscular, Q8H PRN    dextrose 50%, 12.5 g, Intravenous, PRN    dextrose 50%, 25 g, Intravenous, PRN    glucagon (human recombinant), 1 mg, Intramuscular, PRN    glucose, 16 g, Oral, PRN    glucose, 24 g, Oral, PRN    hydrOXYzine pamoate, 50 mg, Oral, Q6H PRN    ibuprofen, 400 mg, Oral, Q6H PRN    insulin aspart U-100, 0-5 Units, Subcutaneous, QID (AC + HS) PRN    loperamide, 2 mg, Oral, PRN    magnesium hydroxide 400 mg/5 ml, 30 mL, Oral, Daily PRN    nicotine, 1 patch, Transdermal, Daily PRN    OLANZapine, 10 mg, Oral, Q8H PRN **AND** OLANZapine, 10 mg, Intramuscular, Q8H PRN    ondansetron, 4 mg, Oral, Q8H PRN    promethazine, 25 mg, Oral, Q6H PRN        EXAMINATION    VITALS   Vitals:    06/14/25 1928 06/15/25 0800 06/15/25 1935 06/16/25 0730   BP: 126/68 122/62 (!) 157/91 118/64   BP Location: Left arm Left arm Left arm Left arm   Patient Position: Sitting Sitting Sitting Sitting   Pulse: 72 68 70 82   Resp: 20 18 20 20   Temp: 98.1 °F (36.7 °C) 97.6 °F (36.4 °C) 98.4 °F (36.9 °C) 98.2 °F (36.8 °C)   TempSrc:  Oral Oral Oral   SpO2: 100% 99% 100% 99%   Weight:       Height:           Body mass index is 23.67 kg/m².        CONSTITUTIONAL  General Appearance: unremarkable, age appropriate    MUSCULOSKELETAL  Muscle Strength and Tone:no tremor, no tic  Abnormal Involuntary Movements: No  Gait and Station: non-ataxic    PSYCHIATRIC   Level of Consciousness: awake, alert , and oriented  Orientation: person, place, time, and situation  Grooming: Casually dressed and Well groomed  Psychomotor Behavior: normal, cooperative  Speech: soft, non-spontaneous, monotone  Language: grossly intact  Mood: anxious and depressed  Affect: Consistent with mood  Thought Process: linear, logical  Associations: intact   Thought Content: denies SI and denies HI  Perceptions: denies AH, denies  VH, and not RIS  Memory: Able to recall past events, Remote intact, and Recent  intact  Attention:Attends to interview without distraction  Fund of Knowledge: Aware of current events and Vocabulary appropriate   Estimate if Intelligence:  Average based on work/education history, vocabulary and mental status exam  Insight: has awareness of illness  Judgment: behavior is adequate to circumstances        DIAGNOSTIC TESTING   Laboratory Results  Recent Results (from the past 24 hours)   POCT glucose    Collection Time: 06/15/25  4:23 PM   Result Value Ref Range    POCT Glucose 325 (H) 70 - 110 mg/dL   POCT glucose    Collection Time: 06/15/25  8:07 PM   Result Value Ref Range    POCT Glucose 280 (H) 70 - 110 mg/dL   POCT glucose    Collection Time: 06/16/25  5:18 AM   Result Value Ref Range    POCT Glucose 164 (H) 70 - 110 mg/dL   POCT glucose    Collection Time: 06/16/25 10:58 AM   Result Value Ref Range    POCT Glucose 318 (H) 70 - 110 mg/dL             MEDICAL DECISION MAKING      ASSESSMENT      Bipolar Disorder NOS, mre depressed severe without psychotic features  Unspecified Specified Anxiety Disorder     Psychosocial stressors     Nicotine dependence  Cannabis abuse     NIDDM        PROBLEM LIST AND MANAGEMENT PLANS         Bipolar Depression: pt counseled  -hold home Trileptal at 150 mg po BID  -start trial of Latuda 20 mg po q day with dinner -Increase to 40 mg - increase to 60 mg PO qd      Anxiety Disorder: pt counseled  -start trial of Buspar 5 mg po BID-Increase to 10 mg BID  -stop  -Initiate gabapentin 100 mg TID off label for anxiety/slee, occasional peripheral neuropathy-Increase to 300 mg TID  -increase to 400 mg PO TID  -Taper inderal to 10 mg BID with plan to DC (ineffective per patient)-taper to 10 mg daily  -Initiate trazodone 50 mg qhs-Increase to 100 mg qhs  -increase to 100 mg PO qhs and 50 mg PO qhs PRN     Psychosocial stressors: pt counseled  -SW consulted to assist with resources     Nicotine dependence: pt counseled  -start nicotine 14 mg patch dermal     Cannabis  abuse: pt counseled     NIDDM: pt counseled  -med consulted  -check HgA1c          Discussed diagnosis, risks and benefits of proposed treatment vs alternative treatments vs no treatment, potential side effects of these treatments and the inherent unpredictability of treatment. The patient expresses understanding of the above and displays the capacity to agree with this treatment given said understanding. Patient also agrees that, currently, the benefits outweigh the risks and would like to pursue/continue treatment at this time.    Any medications being used off-label were discussed with the patient inclusive of the evidence base for the use of the medications and consent was obtained for the off-label use of the medication.       DISCHARGE PLANNING  Expected Disposition Plan: Home or Self Care      NEED FOR CONTINUED HOSPITALIZATION  Psychiatric illness continues to pose a potential threat to life or bodily function, of self or others, thereby requiring the need for continued inpatient psychiatric hospitalization: Yes, due to: danger to self, as evidenced by:  Ongoing concerns with SI.    Protective inpatient pyschiatric hospitalization required while a safe disposition plan is enacted: Yes    Patient stabilized and ready for discharge from inpatient psychiatric unit: No      The patient [facility]: [facility]    Visit type: audiovisual    Face to Face time with patient: 18  20 minutes of total time spent on the encounter, which includes face to face time and non-face to face time preparing to see the patient (eg, review of tests), Obtaining and/or reviewing separately obtained history, Documenting clinical information in the electronic or other health record, Independently interpreting results (not separately reported) and communicating results to the patient/family/caregiver, or Care coordination (not separately reported).     Each patient to whom he or she provides medical services by telemedicine is:  (1)  informed of the relationship between the physician and patient and the respective role of any other health care provider with respect to management of the patient; and (2) notified that he or she may decline to receive medical services by telemedicine and may withdraw from such care at any time.      STAFF:   Tereso Disla III, MD  Psychiatry

## 2025-06-17 LAB
POCT GLUCOSE: 127 MG/DL (ref 70–110)
POCT GLUCOSE: 217 MG/DL (ref 70–110)
POCT GLUCOSE: 279 MG/DL (ref 70–110)
POCT GLUCOSE: 296 MG/DL (ref 70–110)

## 2025-06-17 PROCEDURE — 25000003 PHARM REV CODE 250: Performed by: PSYCHIATRY & NEUROLOGY

## 2025-06-17 PROCEDURE — 25000003 PHARM REV CODE 250: Performed by: STUDENT IN AN ORGANIZED HEALTH CARE EDUCATION/TRAINING PROGRAM

## 2025-06-17 PROCEDURE — 90833 PSYTX W PT W E/M 30 MIN: CPT | Mod: SA,HB,, | Performed by: PSYCHIATRY & NEUROLOGY

## 2025-06-17 PROCEDURE — S4991 NICOTINE PATCH NONLEGEND: HCPCS | Performed by: PSYCHIATRY & NEUROLOGY

## 2025-06-17 PROCEDURE — 99232 SBSQ HOSP IP/OBS MODERATE 35: CPT | Mod: SA,HB,, | Performed by: PSYCHIATRY & NEUROLOGY

## 2025-06-17 PROCEDURE — 11400000 HC PSYCH PRIVATE ROOM

## 2025-06-17 RX ADMIN — DOCUSATE SODIUM 100 MG: 100 CAPSULE, LIQUID FILLED ORAL at 08:06

## 2025-06-17 RX ADMIN — Medication 9 MG: at 08:06

## 2025-06-17 RX ADMIN — TRAZODONE HYDROCHLORIDE 100 MG: 100 TABLET ORAL at 08:06

## 2025-06-17 RX ADMIN — INSULIN GLARGINE 20 UNITS: 100 INJECTION, SOLUTION SUBCUTANEOUS at 08:06

## 2025-06-17 RX ADMIN — INSULIN ASPART 3 UNITS: 100 INJECTION, SOLUTION INTRAVENOUS; SUBCUTANEOUS at 04:06

## 2025-06-17 RX ADMIN — INSULIN ASPART 3 UNITS: 100 INJECTION, SOLUTION INTRAVENOUS; SUBCUTANEOUS at 11:06

## 2025-06-17 RX ADMIN — HYDROXYZINE PAMOATE 50 MG: 50 CAPSULE ORAL at 07:06

## 2025-06-17 RX ADMIN — LURASIDONE HYDROCHLORIDE 60 MG: 40 TABLET, FILM COATED ORAL at 04:06

## 2025-06-17 RX ADMIN — INSULIN ASPART 1 UNITS: 100 INJECTION, SOLUTION INTRAVENOUS; SUBCUTANEOUS at 08:06

## 2025-06-17 RX ADMIN — GABAPENTIN 400 MG: 400 CAPSULE ORAL at 08:06

## 2025-06-17 RX ADMIN — GABAPENTIN 400 MG: 400 CAPSULE ORAL at 03:06

## 2025-06-17 RX ADMIN — NICOTINE 1 PATCH: 14 PATCH, EXTENDED RELEASE TRANSDERMAL at 08:06

## 2025-06-17 NOTE — PLAN OF CARE
Problem: Adult Behavioral Health Plan of Care  Goal: Optimized Coping Skills in Response to Life Stressors  Intervention: Promote Effective Coping Strategies  Flowsheets (Taken 6/17/2025 1619)  Supportive Measures:   active listening utilized   counseling provided   decision-making supported   goal-setting facilitated   journaling promoted   problem-solving facilitated   self-care encouraged   self-reflection promoted   self-responsibility promoted   verbalization of feelings encouraged       Behavior:  Patient arrived on time and was minimally engaged in conversation. Patient shared when directly called upon and engaged appropriately with peers.  Patient was observed attentively listening. She presented as alert and oriented, and appeared more comfortable in the group setting.    Intervention:  The counselor facilitated a group discussion on identifying the sources and effects of resentment, emphasizing how it can hinder emotional well-being and interpersonal relationships. Patients were encouraged to reflect on their personal responsibility in conflict and how accountability can support healing and growth. The group explored practical tools such as boundary-setting and self-reflection. Psychoeducation was provided on the difference between blame and accountability and how forgiveness can support emotional release.    Response:  Patient responded well to the intervention. She had completed the suggested homework from the session prior, and shared that the process was difficult but rewarding; she shared a sense of relief after sitting in self-reflection and coming to a place of forgiveness. The patient demonstrated increased insight into how unresolved resentment affects her behavior and relationships. She expressed openness to practicing accountability and shared a willingness to consider having a restorative conversation with someone she has been holding resentment toward. The patient reported feeling supported  by the group and noted that others experiences helped normalize her own struggles.    Plan:  Patient will be encouraged to participate in MET/CBT- based groups 2-5 times per week and/or 1:1 as needed to address issues and coping strategies. Patient will participate in recreational groups 2-5 times per week and/or 1:1 as needed to build peer support and relaxation skills.

## 2025-06-17 NOTE — PROGRESS NOTES
"PSYCHIATRY DAILY INPATIENT PROGRESS NOTE  SUBSEQUENT HOSPITAL VISIT    ENCOUNTER DATE: 6/17/2025  SITE: [facility]    DATE OF ADMISSION: 6/10/2025  1:45 AM  LENGTH OF STAY: 7 days      CHIEF COMPLAINT   Sandrine Roland is a 50 y.o. female, seen during daily smith rounds on the inpatient unit.  Sandrine Roland presented with the chief complaint of anxiety, "I was having anxiety attacks and couldn't breathe."      The patient was seen and examined. The chart was reviewed.     Reviewed notes from Rns and labs from the last 24 hours.    The patient's case was discussed with the treatment team/care providers today including Rns    Staff reports no behavioral or management issues.     The patient has been compliant with treatment.      Subjective 06/17/2025    Patient reports continued improvement in symptoms. Reports anxiety is still present but "much better than it was." She denies paranoia today- When asked about concerns that her sister "had people following" her (per yesterday's note), but laughed briefly and stated "No, I don't think that anymore."     She verbalized continued interest in participating in an IOP after discharge from hospital.     Reports sleep has been steadily improving.     Pt likely candidate for discharge tomorrow.       Psychotherapy:  Target symptoms: depression, anxiety , substance abuse, psychosis  Why chosen therapy is appropriate versus another modality: relevant to diagnosis  Outcome monitoring methods: self-report  Therapeutic intervention type: supportive psychotherapy  Topics discussed/themes: building skills sets for symptom management, symptom recognition, substance abuse  The patient's response to the intervention is accepting. The patient's progress toward treatment goals is fair.   Duration of intervention: 16 minutes.          Interim/overnight events per report/notes:          Psychiatric ROS (observed, reported, or endorsed/denied):  Depressed mood - " less  Interest/pleasure/anhedonia: less  Guilt/hopelessness/worthlessness - denies  Changes in Sleep - Yes  Changes in Appetite - No  Changes in Concentration - No  Changes in Energy - less  PMA/R- No  Suicidal- active/passive ideations - No  Homicidal ideations: active/passive ideations - No    Hallucinations - No  Delusions - No  Disorganized behavior - No  Disorganized speech - No  Negative symptoms - No    Elevated mood - No  Decreased need for sleep - No  Grandiosity - No  Racing thoughts - No  Impulsivity - No  Irritability- No  Increased energy - No  Distractibility - No  Increase in goal-directed activity or PMA- No    Symptoms of ZEE - Yes  Symptoms of Panic Disorder- No  Symptoms of PTSD - No        Overall progress: Patient is showing mild improvement           Psychotherapy:  Target symptoms: depression, anxiety   Why chosen therapy is appropriate versus another modality: relevant to diagnosis, evidence based practice  Outcome monitoring methods: self-report, observation  Therapeutic intervention type: insight oriented psychotherapy, supportive psychotherapy  Topics discussed/themes: work stress, parenting issues, symptom recognition  The patient's response to the intervention is accepting. The patient's progress toward treatment goals is good.   Duration of intervention: 20 minutes.     Medical ROS  Review of Systems   Constitutional: Negative.    HENT: Negative.     Eyes: Negative.    Respiratory: Negative.     Cardiovascular: Negative.    Gastrointestinal: Negative.    Genitourinary: Negative.    Musculoskeletal: Negative.    Skin: Negative.    Neurological: Negative.    Endo/Heme/Allergies: Negative.    Psychiatric/Behavioral:  The patient is nervous/anxious.          PAST MEDICAL HISTORY   Past Medical History:   Diagnosis Date    Bipolar 1 disorder     Diabetes mellitus            PSYCHOTROPIC MEDICATIONS   Scheduled Meds:   docusate sodium  100 mg Oral Daily    gabapentin  400 mg Oral TID     insulin glargine U-100  20 Units Subcutaneous QHS    lurasidone  60 mg Oral with dinner    melatonin  9 mg Oral Nightly    traZODone  100 mg Oral QHS     Continuous Infusions:  PRN Meds:.  Current Facility-Administered Medications:     aluminum-magnesium hydroxide-simethicone, 30 mL, Oral, Q6H PRN    benzonatate, 100 mg, Oral, TID PRN    benztropine mesylate, 2 mg, Intramuscular, Q8H PRN    dextrose 50%, 12.5 g, Intravenous, PRN    dextrose 50%, 25 g, Intravenous, PRN    glucagon (human recombinant), 1 mg, Intramuscular, PRN    glucose, 16 g, Oral, PRN    glucose, 24 g, Oral, PRN    hydrOXYzine pamoate, 50 mg, Oral, Q6H PRN    ibuprofen, 400 mg, Oral, Q6H PRN    insulin aspart U-100, 0-5 Units, Subcutaneous, QID (AC + HS) PRN    loperamide, 2 mg, Oral, PRN    magnesium hydroxide 400 mg/5 ml, 30 mL, Oral, Daily PRN    nicotine, 1 patch, Transdermal, Daily PRN    OLANZapine, 10 mg, Oral, Q8H PRN **AND** OLANZapine, 10 mg, Intramuscular, Q8H PRN    ondansetron, 4 mg, Oral, Q8H PRN    traZODone, 50 mg, Oral, Nightly PRN        EXAMINATION    VITALS   Vitals:    06/15/25 1935 06/16/25 0730 06/16/25 1904 06/17/25 0730   BP: (!) 157/91 118/64 (!) 162/76 108/62   BP Location: Left arm Left arm Left arm Left arm   Patient Position: Sitting Sitting Sitting Sitting   Pulse: 70 82 73 88   Resp: 20 20 20 18   Temp: 98.4 °F (36.9 °C) 98.2 °F (36.8 °C) 98.3 °F (36.8 °C) 97.6 °F (36.4 °C)   TempSrc: Oral Oral Oral Oral   SpO2: 100% 99% 99% 99%   Weight:       Height:           Body mass index is 23.67 kg/m².        CONSTITUTIONAL  General Appearance: unremarkable, age appropriate    MUSCULOSKELETAL  Muscle Strength and Tone:no tremor, no tic  Abnormal Involuntary Movements: No  Gait and Station: non-ataxic    PSYCHIATRIC   Level of Consciousness: awake, alert , and oriented  Orientation: person, place, time, and situation  Grooming: Casually dressed and Well groomed  Psychomotor Behavior: normal, cooperative  Speech: normal tone,  "normal rate, soft  Language: grossly intact  Mood: "Better"  Affect: Consistent with mood  Thought Process: linear, logical  Associations: intact   Thought Content: denies SI and denies HI  Perceptions: denies AH, denies  VH, and not RIS  Memory: Able to recall past events, Remote intact, and Recent intact  Attention:Attends to interview without distraction  Fund of Knowledge: Aware of current events and Vocabulary appropriate   Estimate if Intelligence:  Average based on work/education history, vocabulary and mental status exam  Insight: has awareness of illness  Judgment: behavior is adequate to circumstances        DIAGNOSTIC TESTING   Laboratory Results  Recent Results (from the past 24 hours)   POCT glucose    Collection Time: 06/16/25 10:58 AM   Result Value Ref Range    POCT Glucose 318 (H) 70 - 110 mg/dL   POCT glucose    Collection Time: 06/16/25  4:48 PM   Result Value Ref Range    POCT Glucose 244 (H) 70 - 110 mg/dL   POCT glucose    Collection Time: 06/16/25  9:08 PM   Result Value Ref Range    POCT Glucose 251 (H) 70 - 110 mg/dL   POCT glucose    Collection Time: 06/17/25  6:03 AM   Result Value Ref Range    POCT Glucose 127 (H) 70 - 110 mg/dL             MEDICAL DECISION MAKING      ASSESSMENT      Bipolar Disorder NOS, mre depressed severe without psychotic features  Unspecified Specified Anxiety Disorder     Psychosocial stressors     Nicotine dependence  Cannabis abuse     NIDDM        PROBLEM LIST AND MANAGEMENT PLANS         Bipolar Depression: pt counseled  -hold home Trileptal at 150 mg po BID  -start trial of Latuda 20 mg po q day with dinner -Increase to 40 mg - increase to 60 mg PO qd      Anxiety Disorder: pt counseled  -start trial of Buspar 5 mg po BID-Increase to 10 mg BID  -stop  -Initiate gabapentin 100 mg TID off label for anxiety/slee, occasional peripheral neuropathy-Increase to 300 mg TID  -increase to 400 mg PO TID  -Taper inderal to 10 mg BID with plan to DC (ineffective per " patient)-taper to 10 mg daily  -Initiate trazodone 50 mg qhs-Increase to 100 mg qhs  -increase to 100 mg PO qhs and 50 mg PO qhs PRN     Psychosocial stressors: pt counseled  -SW consulted to assist with resources     Nicotine dependence: pt counseled  -start nicotine 14 mg patch dermal     Cannabis abuse: pt counseled     NIDDM: pt counseled  -med consulted  -check HgA1c          Discussed diagnosis, risks and benefits of proposed treatment vs alternative treatments vs no treatment, potential side effects of these treatments and the inherent unpredictability of treatment. The patient expresses understanding of the above and displays the capacity to agree with this treatment given said understanding. Patient also agrees that, currently, the benefits outweigh the risks and would like to pursue/continue treatment at this time.    Any medications being used off-label were discussed with the patient inclusive of the evidence base for the use of the medications and consent was obtained for the off-label use of the medication.       DISCHARGE PLANNING  Expected Disposition Plan: Home or Self Care      NEED FOR CONTINUED HOSPITALIZATION  Psychiatric illness continues to pose a potential threat to life or bodily function, of self or others, thereby requiring the need for continued inpatient psychiatric hospitalization: Yes, due to: danger to self, as evidenced by:  Ongoing concerns with SI.    Protective inpatient pyschiatric hospitalization required while a safe disposition plan is enacted: Yes    Patient stabilized and ready for discharge from inpatient psychiatric unit: No           STAFF:   Arash Christy NP  Psychiatry

## 2025-06-17 NOTE — NURSING
Patient able to make needs known. No complaint of pain noted. Complaint of anxiety noted. Patient CBG does remain elevated. Coverage given. Tolerated well. Compliant with other medication. Compliant with staff and other patients. Denies any SI/HI. Denies any A/VH. Patient does have a discharge plan in place with medical staff. Plan of care continued.

## 2025-06-17 NOTE — NURSING
"POC reviewed this shift and is ongoing.  Pt is cooperative with care and complaint with medications.  Pt denies SI/HI/AVH.  Pt continues to endorse anxiety, but reports improvement.  Pt reports overall improvement in mood, states, "I'm doing better."  Pt was primarily isolated to self and room this shift, out only as necessary for care.    "

## 2025-06-18 VITALS
BODY MASS INDEX: 23.68 KG/M2 | HEIGHT: 63 IN | SYSTOLIC BLOOD PRESSURE: 101 MMHG | OXYGEN SATURATION: 98 % | RESPIRATION RATE: 18 BRPM | TEMPERATURE: 98 F | HEART RATE: 84 BPM | WEIGHT: 133.63 LBS | DIASTOLIC BLOOD PRESSURE: 79 MMHG

## 2025-06-18 LAB
POCT GLUCOSE: 177 MG/DL (ref 70–110)
POCT GLUCOSE: 247 MG/DL (ref 70–110)
POCT GLUCOSE: 303 MG/DL (ref 70–110)

## 2025-06-18 PROCEDURE — 25000003 PHARM REV CODE 250: Performed by: PSYCHIATRY & NEUROLOGY

## 2025-06-18 PROCEDURE — S4991 NICOTINE PATCH NONLEGEND: HCPCS | Performed by: PSYCHIATRY & NEUROLOGY

## 2025-06-18 PROCEDURE — 99239 HOSP IP/OBS DSCHRG MGMT >30: CPT | Mod: AF,HB,, | Performed by: STUDENT IN AN ORGANIZED HEALTH CARE EDUCATION/TRAINING PROGRAM

## 2025-06-18 PROCEDURE — 25000003 PHARM REV CODE 250: Performed by: STUDENT IN AN ORGANIZED HEALTH CARE EDUCATION/TRAINING PROGRAM

## 2025-06-18 RX ORDER — TRAZODONE HYDROCHLORIDE 50 MG/1
TABLET ORAL
Qty: 90 TABLET | Refills: 0 | Status: SHIPPED | OUTPATIENT
Start: 2025-06-18

## 2025-06-18 RX ORDER — IBUPROFEN 200 MG
1 TABLET ORAL DAILY PRN
Qty: 30 PATCH | Refills: 0 | Status: SHIPPED | OUTPATIENT
Start: 2025-06-18

## 2025-06-18 RX ORDER — GABAPENTIN 400 MG/1
400 CAPSULE ORAL 3 TIMES DAILY
Qty: 90 CAPSULE | Refills: 0 | Status: SHIPPED | OUTPATIENT
Start: 2025-06-18 | End: 2026-06-18

## 2025-06-18 RX ORDER — HYDROXYZINE PAMOATE 50 MG/1
50 CAPSULE ORAL EVERY 6 HOURS PRN
Qty: 90 CAPSULE | Refills: 0 | Status: SHIPPED | OUTPATIENT
Start: 2025-06-18

## 2025-06-18 RX ORDER — LURASIDONE HYDROCHLORIDE 60 MG/1
60 TABLET, FILM COATED ORAL
Qty: 30 TABLET | Refills: 0 | Status: SHIPPED | OUTPATIENT
Start: 2025-06-18 | End: 2026-06-18

## 2025-06-18 RX ADMIN — LURASIDONE HYDROCHLORIDE 60 MG: 40 TABLET, FILM COATED ORAL at 04:06

## 2025-06-18 RX ADMIN — DOCUSATE SODIUM 100 MG: 100 CAPSULE, LIQUID FILLED ORAL at 08:06

## 2025-06-18 RX ADMIN — NICOTINE 1 PATCH: 14 PATCH, EXTENDED RELEASE TRANSDERMAL at 08:06

## 2025-06-18 RX ADMIN — INSULIN ASPART 3 UNITS: 100 INJECTION, SOLUTION INTRAVENOUS; SUBCUTANEOUS at 04:06

## 2025-06-18 RX ADMIN — GABAPENTIN 400 MG: 400 CAPSULE ORAL at 02:06

## 2025-06-18 RX ADMIN — INSULIN ASPART 2 UNITS: 100 INJECTION, SOLUTION INTRAVENOUS; SUBCUTANEOUS at 10:06

## 2025-06-18 RX ADMIN — GABAPENTIN 400 MG: 400 CAPSULE ORAL at 08:06
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
## 2025-06-18 NOTE — PLAN OF CARE
Problem: Adult Behavioral Health Plan of Care  Goal: Optimized Coping Skills in Response to Life Stressors  Intervention: Promote Effective Coping Strategies  Flowsheets (Taken 6/18/2025 4073)  Supportive Measures:   active listening utilized   counseling provided   relaxation techniques promoted   decision-making supported   goal-setting facilitated   guided imagery facilitated   journaling promoted   positive reinforcement provided   verbalization of feelings encouraged   problem-solving facilitated   self-care encouraged   self-reflection promoted   self-responsibility promoted       Behavior:  Patient arrived on time and was actively engaged in group discussion. She shared at most opportunities, asked relevant questions, made meaningful contributions to the conversation, and engaged appropriately with peers. She presented as alert and oriented.     Intervention:  The counselor facilitated a psychoeducational discussion on the HALT acronym, emphasizing its impact on emotional regulation and anger management. Coping strategies such as mindfulness, emotional check-ins, and self-care routines were introduced. Patients were encouraged to identify their most common HALT trigger and explore alternative responses to anger.     Response:  Patient responded well to the intervention. The patient demonstrated insight into her emotional triggers and expressed a willingness to apply HALT awareness in her daily life. She responded positively to peer feedback and appeared motivated to monitor her needs before reacting impulsively in emotionally charged situations. Patient expressed that she felt these skills would be easy to incorporate for anxiety triggers as well!     Plan:  Patient will be encouraged to participate in MET/CBT- based groups 2-5 times per week and/or 1:1 as needed to address issues and coping strategies. Patient will participate in recreational groups 2-5 times per week and/or 1:1 as needed to build peer  support and relaxation skills.

## 2025-06-18 NOTE — NURSING
POC reviewed this shift and is ongoing.  Pt is cooperative with care and complaint with medications.  Pt denies SI/HI/AVH, continues to endorse anxiety.  Pt does report that it has improved since admission.

## 2025-06-19 NOTE — NURSING
Patient discharged home with self care.  Patient denied all ideations.  Patient denied ideations.